# Patient Record
Sex: FEMALE | Race: BLACK OR AFRICAN AMERICAN | NOT HISPANIC OR LATINO | Employment: UNEMPLOYED | URBAN - METROPOLITAN AREA
[De-identification: names, ages, dates, MRNs, and addresses within clinical notes are randomized per-mention and may not be internally consistent; named-entity substitution may affect disease eponyms.]

---

## 2018-05-31 ENCOUNTER — INITIAL PRENATAL (OUTPATIENT)
Dept: OBSTETRICS AND GYNECOLOGY | Facility: CLINIC | Age: 33
End: 2018-05-31
Payer: MEDICAID

## 2018-05-31 VITALS
BODY MASS INDEX: 22.56 KG/M2 | DIASTOLIC BLOOD PRESSURE: 60 MMHG | HEIGHT: 69 IN | WEIGHT: 152.31 LBS | SYSTOLIC BLOOD PRESSURE: 102 MMHG

## 2018-05-31 DIAGNOSIS — O99.332 MATERNAL TOBACCO USE IN SECOND TRIMESTER: ICD-10-CM

## 2018-05-31 DIAGNOSIS — Z34.02 ENCOUNTER FOR SUPERVISION OF NORMAL FIRST PREGNANCY IN SECOND TRIMESTER: ICD-10-CM

## 2018-05-31 PROBLEM — Z34.92 ENCOUNTER FOR SUPERVISION OF NORMAL PREGNANCY IN SECOND TRIMESTER: Status: RESOLVED | Noted: 2018-05-31 | Resolved: 2018-05-31

## 2018-05-31 PROBLEM — Z34.92 ENCOUNTER FOR SUPERVISION OF NORMAL PREGNANCY IN SECOND TRIMESTER: Status: ACTIVE | Noted: 2018-05-31

## 2018-05-31 PROBLEM — O99.330 TOBACCO USE IN PREGNANCY: Status: ACTIVE | Noted: 2018-05-31

## 2018-05-31 PROBLEM — Z34.90 SUPERVISION OF NORMAL PREGNANCY: Status: ACTIVE | Noted: 2018-05-31

## 2018-05-31 PROCEDURE — 99202 OFFICE O/P NEW SF 15 MIN: CPT | Mod: TH,S$PBB,, | Performed by: STUDENT IN AN ORGANIZED HEALTH CARE EDUCATION/TRAINING PROGRAM

## 2018-05-31 PROCEDURE — 99999 PR PBB SHADOW E&M-NEW PATIENT-LVL II: CPT | Mod: PBBFAC,,, | Performed by: STUDENT IN AN ORGANIZED HEALTH CARE EDUCATION/TRAINING PROGRAM

## 2018-05-31 PROCEDURE — 99202 OFFICE O/P NEW SF 15 MIN: CPT | Mod: PBBFAC,TH,PO | Performed by: STUDENT IN AN ORGANIZED HEALTH CARE EDUCATION/TRAINING PROGRAM

## 2018-05-31 NOTE — PROGRESS NOTES
"Complaints today:  None. No vb/lof/ctxns. Normal fm.  Just moved here from [place].    /60   Ht 5' 9" (1.753 m)   Wt 69.1 kg (152 lb 5.4 oz)   LMP 2017   BMI 22.50 kg/m²     32 y.o., at 22w3d by Estimated Date of Delivery: 10/1/18  Patient Active Problem List   Diagnosis    Supervision of normal pregnancy     OB History    Para Term  AB Living   1             SAB TAB Ectopic Multiple Live Births                  # Outcome Date GA Lbr Tom/2nd Weight Sex Delivery Anes PTL Lv   1 Current                   Dating reviewed  Allergies and problem list reviewed and updated  Medical and surgical history reviewed  Prenatal labs reviewed and updated    Physical Exam:  ABD: soft, gravid, nontender    Assessment/Plan:  --records requested  --MFM scan ordered  --f/u in 2 weeks: will need glucola at next visit      "

## 2018-06-03 ENCOUNTER — PATIENT MESSAGE (OUTPATIENT)
Dept: OBSTETRICS AND GYNECOLOGY | Facility: CLINIC | Age: 33
End: 2018-06-03

## 2018-06-12 ENCOUNTER — OFFICE VISIT (OUTPATIENT)
Dept: MATERNAL FETAL MEDICINE | Facility: CLINIC | Age: 33
End: 2018-06-12
Payer: MEDICAID

## 2018-06-12 DIAGNOSIS — Z36.89 ENCOUNTER FOR ULTRASOUND TO CHECK FETAL GROWTH: Primary | ICD-10-CM

## 2018-06-12 DIAGNOSIS — Z36.89 ENCOUNTER FOR FETAL ANATOMIC SURVEY: ICD-10-CM

## 2018-06-12 DIAGNOSIS — Z34.02 ENCOUNTER FOR SUPERVISION OF NORMAL FIRST PREGNANCY IN SECOND TRIMESTER: ICD-10-CM

## 2018-06-12 DIAGNOSIS — Z36.89 ESTABLISH GESTATIONAL AGE, ULTRASOUND: ICD-10-CM

## 2018-06-12 PROCEDURE — 76805 OB US >/= 14 WKS SNGL FETUS: CPT | Mod: 26,S$PBB,, | Performed by: OBSTETRICS & GYNECOLOGY

## 2018-06-12 PROCEDURE — 99499 UNLISTED E&M SERVICE: CPT | Mod: S$PBB,,, | Performed by: OBSTETRICS & GYNECOLOGY

## 2018-06-12 PROCEDURE — 76805 OB US >/= 14 WKS SNGL FETUS: CPT | Mod: PBBFAC | Performed by: OBSTETRICS & GYNECOLOGY

## 2018-06-12 NOTE — PROGRESS NOTES
OB Ultrasound Report (see PDF version under imaging tab)    Indication  ========    Fetal anatomy survey.    Method  ======    [place] E10, Transabdominal ultrasound examination. View: Suboptimal view: limited by fetal position.    Pregnancy  =========    Weiss pregnancy. Number of fetuses: 1.    Dating  ======    Cycle: LMP date uncertain  Ultrasound examination on: 6/12/2018  GA by U/S based upon: AC, BPD, Femur, HC  GA by U/S 25 w + 4 d  KARLA by U/S: 9/21/2018  Assigned: Dating performed on 06/12/2018, based on ultrasound (AC, BPD, Femur, HC)  Assigned GA 25 w + 4 d  Assigned KARLA: 9/21/2018    General Evaluation  ===============    Cardiac activity: present.  bpm.  Fetal movements: visualized.  Placenta: posterior.  Umbilical cord: placental insertion: normal, normal, 3 vessel cord.  Amniotic fluid: normal amount.    Fetal Biometry  ===========    Fetal Biometry  BPD 65.8 mm 26w 4d Hadlock  OFD 80.4 mm 26w 1d Alek  .9 mm 25w 2d Hadlock  .3 mm 25w 1d Hadlock  Femur 46.3 mm 25w 3d Hadlock  Humerus 45.4 mm 26w 6d Alek   g 39% Keenan  Calculated by: Hadlock (BPD-HC-AC-FL)  EFW (lb) 1 lb  EFW (oz) 12 oz  Cephalic index 0.82  HC / AC 1.13  FL / BPD 0.70  FL / AC 0.23  MVP 5.1 cm   bpm  Head / Face / Neck   3.6 mm    Fetal Anatomy  ===========    Cranium: normal  Lateral ventricles: normal  Choroid plexus: normal  Midline falx: normal  Cavum septi pellucidi: suboptimal  Cerebellum: normal  Cisterna magna: normal  Rt lateral ventricle: normal  Lt lateral ventricle: normal  Rt choroid plexus: normal  Lt choroid plexus: normal  Lips: normal  Profile: normal  Nose: normal  4-chamber view: normal  RVOT: normal  LVOT: normal  Situs: normal  Aortic arch: normal  3-vessel-trachea view: normal  Cardiac position: normal  Cardiac axis: normal  Cardiac size: normal  Cardiac rhythm: normal  Rt lung: normal  Lt lung: normal  Diaphragm: normal  Cord  insertion: normal  Stomach: normal  Kidneys: normal  Bladder: normal  Genitals: normal  Abdom. wall: appears normal  Abdom. cavity: normal  Rt kidney: normal  Lt kidney: normal  Cervical spine: normal  Thoracic spine: normal  Lumbar spine: normal  Sacral spine: normal  Arms: normal  Legs: normal  Rt arm: normal  Lt arm: normal  Rt hand: present  Lt hand: present  Rt leg: normal  Lt leg: normal  Rt foot: normal  Lt foot: normal  Gender: male  Wants to know gender: yes    Maternal Structures  ===============    Uterus / Cervix  Uterus: Normal  Ovaries / Tubes / Adnexa  Rt ovary: Visualized  Lt ovary: Visualized    Impression  =========    1. IUP - 25 and 4/7 weeks +/- 2 weeks, based on today's ultrasound exam  2. No fetal structural malformations are identified, but the exam is incomplete as noted above.  3. No evidence of placenta previa.    Recommendation  ==============    Because the patient's pregnancy is suboptimally dated (ultrasound assignment of KARLA after 22 weeks), a f/u exam in 4 - 6 weeks to  assess fetal growth is recommended and has been scheduled. Also recommend initiation of fetal antepartum testing at 39 - 40 weeks  since actual gestational age may be up to 2 weeks more advanced than assigned KARLA.

## 2018-06-19 ENCOUNTER — TELEPHONE (OUTPATIENT)
Dept: OBSTETRICS AND GYNECOLOGY | Facility: CLINIC | Age: 33
End: 2018-06-19

## 2018-06-19 NOTE — TELEPHONE ENCOUNTER
----- Message from Lelia Payan sent at 6/19/2018 10:50 AM CDT -----  Contact: Self  Patient needs pregnancy confirmation faxed to the [facility]. [facility] office fax number is [phone].

## 2018-06-28 ENCOUNTER — TELEPHONE (OUTPATIENT)
Dept: OBSTETRICS AND GYNECOLOGY | Facility: CLINIC | Age: 33
End: 2018-06-28

## 2018-06-28 NOTE — TELEPHONE ENCOUNTER
Spoke to pt labor precautions given per Dr. Conn. Pt scheduled for a f/u on 7/3 with Anny Gomez. Pt advised that she will be seeing a np and expressed understanding

## 2018-06-28 NOTE — TELEPHONE ENCOUNTER
----- Message from Lelia Payan sent at 6/28/2018 11:20 AM CDT -----  Contact: Self   Patient stated she just noticed a big discharge and would like to be contacted before going to the ED for treatment. The patient can be reached at [phone].

## 2018-07-03 ENCOUNTER — APPOINTMENT (OUTPATIENT)
Dept: LAB | Facility: HOSPITAL | Age: 33
End: 2018-07-03
Attending: ADVANCED PRACTICE MIDWIFE
Payer: MEDICAID

## 2018-07-03 ENCOUNTER — PATIENT MESSAGE (OUTPATIENT)
Dept: OBSTETRICS AND GYNECOLOGY | Facility: CLINIC | Age: 33
End: 2018-07-03

## 2018-07-03 ENCOUNTER — ROUTINE PRENATAL (OUTPATIENT)
Dept: OBSTETRICS AND GYNECOLOGY | Facility: CLINIC | Age: 33
End: 2018-07-03
Payer: MEDICAID

## 2018-07-03 VITALS — SYSTOLIC BLOOD PRESSURE: 112 MMHG | BODY MASS INDEX: 23.11 KG/M2 | DIASTOLIC BLOOD PRESSURE: 62 MMHG | WEIGHT: 156.5 LBS

## 2018-07-03 DIAGNOSIS — Z3A.28 28 WEEKS GESTATION OF PREGNANCY: Primary | ICD-10-CM

## 2018-07-03 DIAGNOSIS — Z34.02 ENCOUNTER FOR SUPERVISION OF NORMAL FIRST PREGNANCY IN SECOND TRIMESTER: ICD-10-CM

## 2018-07-03 LAB
BASOPHILS # BLD AUTO: 0.01 K/UL
BASOPHILS NFR BLD: 0.1 %
DIFFERENTIAL METHOD: ABNORMAL
EOSINOPHIL # BLD AUTO: 0.1 K/UL
EOSINOPHIL NFR BLD: 0.7 %
ERYTHROCYTE [DISTWIDTH] IN BLOOD BY AUTOMATED COUNT: 13.6 %
GLUCOSE SERPL-MCNC: 132 MG/DL
HCT VFR BLD AUTO: 34.8 %
HGB BLD-MCNC: 11.3 G/DL
IMM GRANULOCYTES # BLD AUTO: 0.06 K/UL
IMM GRANULOCYTES NFR BLD AUTO: 0.6 %
LYMPHOCYTES # BLD AUTO: 2.8 K/UL
LYMPHOCYTES NFR BLD: 28.9 %
MCH RBC QN AUTO: 30.4 PG
MCHC RBC AUTO-ENTMCNC: 32.5 G/DL
MCV RBC AUTO: 94 FL
MONOCYTES # BLD AUTO: 0.5 K/UL
MONOCYTES NFR BLD: 4.8 %
NEUTROPHILS # BLD AUTO: 6.3 K/UL
NEUTROPHILS NFR BLD: 64.9 %
NRBC BLD-RTO: 0 /100 WBC
PLATELET # BLD AUTO: 212 K/UL
PMV BLD AUTO: 11.3 FL
RBC # BLD AUTO: 3.72 M/UL
WBC # BLD AUTO: 9.7 K/UL

## 2018-07-03 PROCEDURE — 99999 PR PBB SHADOW E&M-EST. PATIENT-LVL III: CPT | Mod: PBBFAC,,, | Performed by: ADVANCED PRACTICE MIDWIFE

## 2018-07-03 PROCEDURE — 99213 OFFICE O/P EST LOW 20 MIN: CPT | Mod: TH,S$PBB,, | Performed by: ADVANCED PRACTICE MIDWIFE

## 2018-07-03 PROCEDURE — 99213 OFFICE O/P EST LOW 20 MIN: CPT | Mod: PBBFAC,TH,PO | Performed by: ADVANCED PRACTICE MIDWIFE

## 2018-07-03 PROCEDURE — 82950 GLUCOSE TEST: CPT

## 2018-07-03 PROCEDURE — 85025 COMPLETE CBC W/AUTO DIFF WBC: CPT

## 2018-07-03 RX ORDER — PNV,CALCIUM 72/IRON/FOLIC ACID 27 MG-1 MG
TABLET ORAL
Refills: 0 | COMMUNITY
Start: 2018-06-12

## 2018-07-03 NOTE — PROGRESS NOTES
Chief Complaint   Patient presents with    Routine Prenatal Visit       33 y.o., at 27w1d by Estimated Date of Delivery: 10/1/18    Complaints today: none    ROS  OBSTETRICS:   Contractions denies   Bleeding denies   Loss of fluid denies   Fetal mvmnt Positive. Reinforced BID movement checks  GASTRO:   Nausea none   Vomiting none      OB History    Para Term  AB Living   1             SAB TAB Ectopic Multiple Live Births                  # Outcome Date GA Lbr Tom/2nd Weight Sex Delivery Anes PTL Lv   1 Current                   Dating reviewed  Allergies and problem list reviewed and updated  Medical and surgical history reviewed  Prenatal labs reviewed and updated    PHYSICAL EXAM  /62   Wt 71 kg (156 lb 8.4 oz)   LMP 2017   BMI 23.11 kg/m²     GENERAL: No acute distress  NEURO: Alert and oriented x3  PSYCH: Normal mood and affect  PULMONARY: Non-labored respiration  ABDomen: Soft, gravid, nontender    ASSESSMENT AND PLAN    gravida1 Problems (from 18 to present)     Problem Noted Resolved    Supervision of normal pregnancy 2018 by Jaya Cope MD No    Tobacco use in pregnancy 2018 by Jaya Cope MD No        Patient watched keep baby close video. Patient inquired about prenatal genetic testing. She recently moved from [place] and states previous provider did not perform genetic testing. Informed patient that she is past [place] where these tests can be performed. Anatomy US to be performed on 7/10/18.     labor precautions given  Follow-up: 3 weeks

## 2018-07-10 ENCOUNTER — OFFICE VISIT (OUTPATIENT)
Dept: MATERNAL FETAL MEDICINE | Facility: CLINIC | Age: 33
End: 2018-07-10
Payer: MEDICAID

## 2018-07-10 DIAGNOSIS — Z36.89 ENCOUNTER FOR ULTRASOUND TO CHECK FETAL GROWTH: ICD-10-CM

## 2018-07-10 PROCEDURE — 76816 OB US FOLLOW-UP PER FETUS: CPT | Mod: 26,S$PBB,, | Performed by: OBSTETRICS & GYNECOLOGY

## 2018-07-10 PROCEDURE — 99499 UNLISTED E&M SERVICE: CPT | Mod: S$PBB,,, | Performed by: OBSTETRICS & GYNECOLOGY

## 2018-07-10 PROCEDURE — 76816 OB US FOLLOW-UP PER FETUS: CPT | Mod: PBBFAC | Performed by: OBSTETRICS & GYNECOLOGY

## 2018-07-10 NOTE — PROGRESS NOTES
Obstetrical ultrasound completed today.  See report in imaging section of [facility].

## 2018-07-19 ENCOUNTER — TELEPHONE (OUTPATIENT)
Dept: OBSTETRICS AND GYNECOLOGY | Facility: CLINIC | Age: 33
End: 2018-07-19

## 2018-07-19 ENCOUNTER — NURSE TRIAGE (OUTPATIENT)
Dept: ADMINISTRATIVE | Facility: CLINIC | Age: 33
End: 2018-07-19

## 2018-07-19 ENCOUNTER — PATIENT MESSAGE (OUTPATIENT)
Dept: OBSTETRICS AND GYNECOLOGY | Facility: CLINIC | Age: 33
End: 2018-07-19

## 2018-07-19 ENCOUNTER — HOSPITAL ENCOUNTER (EMERGENCY)
Facility: OTHER | Age: 33
Discharge: HOME | End: 2018-07-19
Attending: OBSTETRICS & GYNECOLOGY
Payer: MEDICAID

## 2018-07-19 VITALS
DIASTOLIC BLOOD PRESSURE: 58 MMHG | SYSTOLIC BLOOD PRESSURE: 105 MMHG | TEMPERATURE: 97 F | OXYGEN SATURATION: 100 % | HEART RATE: 106 BPM

## 2018-07-19 DIAGNOSIS — Z3A.30 30 WEEKS GESTATION OF PREGNANCY: ICD-10-CM
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
## 2018-07-19 LAB
ABO + RH BLD: NORMAL
AMPHET+METHAMPHET UR QL: NEGATIVE
BARBITURATES UR QL SCN>200 NG/ML: NEGATIVE
BENZODIAZ UR QL SCN>200 NG/ML: NEGATIVE
BLD GP AB SCN CELLS X3 SERPL QL: NORMAL
BZE UR QL SCN: NEGATIVE
CANNABINOIDS UR QL SCN: NEGATIVE
CREAT UR-MCNC: 8.7 MG/DL
ETHANOL UR-MCNC: <10 MG/DL
METHADONE UR QL SCN>300 NG/ML: NEGATIVE
OPIATES UR QL SCN: NEGATIVE
PCP UR QL SCN>25 NG/ML: NEGATIVE
TOXICOLOGY INFORMATION: ABNORMAL

## 2018-07-19 PROCEDURE — 80307 DRUG TEST PRSMV CHEM ANLYZR: CPT

## 2018-07-19 PROCEDURE — 99284 EMERGENCY DEPT VISIT MOD MDM: CPT | Mod: 25

## 2018-07-19 PROCEDURE — 59025 FETAL NON-STRESS TEST: CPT | Mod: 26,,, | Performed by: OBSTETRICS & GYNECOLOGY

## 2018-07-19 PROCEDURE — 99284 EMERGENCY DEPT VISIT MOD MDM: CPT | Mod: 25,,, | Performed by: OBSTETRICS & GYNECOLOGY

## 2018-07-19 PROCEDURE — 86901 BLOOD TYPING SEROLOGIC RH(D): CPT

## 2018-07-19 PROCEDURE — 59025 FETAL NON-STRESS TEST: CPT

## 2018-07-19 NOTE — ED NOTES
Arrived in [place] with complaint of fall to right side of abdomen @ 6am this morning.  States + fetal movement.  Denies contractions, vaginal bleeding, or rupture of membranes.  Escorted to room 4 for NST.

## 2018-07-19 NOTE — ED PROVIDER NOTES
Encounter Date: 2018       History     Chief Complaint   Patient presents with    Fall     Nat Coleman is a 33 y.o. F at 30w6d presents complaining of a fall this morning. She had just awoken from sleep and was not feeling fully awake. She tripped and fell on her right side. She did not hit her stomach, did not hit her head and did not lose consciousness. Has felt the baby move since falling. Her Rh status is unknown at this time. Her placenta is left lateral in location.    This IUP is complicated by homelessness.  Patient denies contractions, denies vaginal bleeding, denies LOF.   Fetal Movement: normal.          Review of patient's allergies indicates:   Allergen Reactions    Pcn [penicillins] Hives     Past Medical History:   Diagnosis Date    Abnormal Pap smear of cervix     Anxiety     Depression     Thyroid disease     hypothyroidism     No past surgical history on file.  Family History   Problem Relation Age of Onset    Diabetes Father     Hypertension Father     Diabetes Mother     Hypertension Mother     Stroke Mother     Cancer Neg Hx     Ovarian cancer Neg Hx      Social History   Substance Use Topics    Smoking status: Current Every Day Smoker     Packs/day: 0.50     Years: 15.00     Types: Cigarettes    Smokeless tobacco: Never Used    Alcohol use No     Review of Systems   Constitutional: Negative for chills, diaphoresis, fatigue and fever.   HENT: Negative for sore throat.    Eyes: Negative for photophobia and visual disturbance.   Respiratory: Negative for cough, chest tightness, shortness of breath and wheezing.    Cardiovascular: Negative for chest pain and palpitations.   Gastrointestinal: Negative for abdominal pain, diarrhea, nausea and vomiting.   Genitourinary: Negative for dysuria, pelvic pain, vaginal bleeding, vaginal discharge and vaginal pain.   Musculoskeletal: Negative for back pain.   Skin: Negative for rash.   Neurological: Negative for dizziness, syncope,  weakness, light-headedness and headaches.   Hematological: Does not bruise/bleed easily.       Physical Exam     Initial Vitals [07/19/18 0700]   BP Pulse Resp Temp SpO2   (!) 105/58 106 -- 97.2 °F (36.2 °C) 100 %      MAP       --         Physical Exam    Vitals reviewed.  Constitutional: She appears well-developed and well-nourished. She is not diaphoretic. No distress.   HENT:   Head: Normocephalic and atraumatic.   Nose: Nose normal.   Eyes: Conjunctivae are normal. Right eye exhibits no discharge. Left eye exhibits no discharge. No scleral icterus.   Neck: Normal range of motion.   Cardiovascular: Normal rate, regular rhythm, normal heart sounds and intact distal pulses.   Pulmonary/Chest: No respiratory distress.   Abdominal: Soft. Bowel sounds are normal. She exhibits no distension. There is no tenderness.   Genitourinary:   Genitourinary Comments: deferred   Neurological: She is alert and oriented to person, place, and time.   Skin: Skin is warm and dry. No rash noted. No erythema.   Psychiatric: She has a normal mood and affect. Her behavior is normal. Judgment and thought content normal.         ED Course   Obtain Fetal nonstress test (NST)  Date/Time: 7/19/2018 9:14 AM  Performed by: PETE CAIN  Authorized by: JOJO LINDSAY     Nonstress Test:     Variability:  6-25 BPM    Decelerations:  None    Accelerations:  10 bpm    Baseline:  130    Uterine Irritability: No      Contractions:  Not present    Contraction Frequency:  Not noted on tocometer  Biophysical Profile:     Nonstress Test Interpretation: reactive      Overall Impression:  Reassuring      Labs Reviewed   TOXICOLOGY SCREEN, URINE, RANDOM (COMPLIANCE) - Abnormal; Notable for the following:        Result Value    Creatinine, Random Ur 8.7 (*)     All other components within normal limits   TYPE & SCREEN          Imaging Results    None          Medical Decision Making:   ED Management:  Patient did not lose consciousness, did  not have abdominal trauma. NST reactive and reassuring. Patient observed for one hour. Rh+ and feeling well. Plan to discharge home.               Attending Attestation:   Physician Attestation Statement for Resident:  As the supervising MD   Physician Attestation Statement: I have personally seen and examined this patient.   I agree with the above history. -:   As the supervising MD I agree with the above PE.    As the supervising MD I agree with the above treatment, course, plan, and disposition.  I was personally present during the critical portions of the procedure(s) performed by the resident and was immediately available in the ED to provide services and assistance as needed during the entire procedure.  I have reviewed and agree with the residents interpretation of the following: rhythm strips and lab data.  I have reviewed the following: old records at this facility.                    ED Course as of Jul 29 2210   Thu Jul 19, 2018   0739 I evaluated Ms. Coleman and discussed case with resident team. Pt presents at 30w6d with fall onto her right side this AM while getting up out of bed.  No pain or ob complaints since then.  Baby is moving well, no vaginal bleeding.  Pt here for 30 minutes, and fetal status reactive/reassuring for ga, baseline at 140.  Possible one contraction at 0724.  No type and screen in system.  Will T+S and utox due to pt acting abnormally upon arrival.  Will complete an hour of monitoring     [HU]   0914 She is RH+, utox negative, feeling well, stable for d/c home  [HU]      ED Course User Index  [HU] Mayra Delvalle DO     Clinical Impression:   The primary encounter diagnosis was Fall, initial encounter. A diagnosis of 30 weeks gestation of pregnancy was also pertinent to this visit.      Disposition:   Disposition: Discharged  Condition: Stable            Juana Colón MD  Resident  07/19/18 0920       Mayra Delvalle DO  07/29/18 2210

## 2018-07-19 NOTE — TELEPHONE ENCOUNTER
----- Message from Lelia Payan sent at 7/19/2018 10:15 AM CDT -----  Contact: Patient   Patient called regarding a pregnancy confirmation letter including her current trimester and due date to be sent to her email address as soon as today. The patient's e-mail address is [email or web address] and can be reached at [phone] for further questions and concerns.
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
## 2018-07-19 NOTE — TELEPHONE ENCOUNTER
----- Message from Lelia Payan sent at 7/19/2018 10:36 AM CDT -----  Contact: Patient   Patient called back stating her address on her confirmation letter is incorrect and if a new one can be sent to the same e-mail address reflecting the new one. The patient's address has been updated in the system.

## 2018-08-07 ENCOUNTER — ROUTINE PRENATAL (OUTPATIENT)
Dept: OBSTETRICS AND GYNECOLOGY | Facility: CLINIC | Age: 33
End: 2018-08-07
Payer: MEDICAID

## 2018-08-07 VITALS
SYSTOLIC BLOOD PRESSURE: 120 MMHG | DIASTOLIC BLOOD PRESSURE: 60 MMHG | WEIGHT: 163.38 LBS | BODY MASS INDEX: 24.12 KG/M2

## 2018-08-07 DIAGNOSIS — Z34.03 ENCOUNTER FOR SUPERVISION OF NORMAL FIRST PREGNANCY IN THIRD TRIMESTER: Primary | ICD-10-CM

## 2018-08-07 PROCEDURE — 99213 OFFICE O/P EST LOW 20 MIN: CPT | Mod: TH,S$PBB,, | Performed by: ADVANCED PRACTICE MIDWIFE

## 2018-08-07 PROCEDURE — 99999 PR PBB SHADOW E&M-EST. PATIENT-LVL II: CPT | Mod: PBBFAC,,, | Performed by: ADVANCED PRACTICE MIDWIFE

## 2018-08-07 PROCEDURE — 99212 OFFICE O/P EST SF 10 MIN: CPT | Mod: PBBFAC,TH,PO | Performed by: ADVANCED PRACTICE MIDWIFE

## 2018-08-07 NOTE — PROGRESS NOTES
Chief Complaint   Patient presents with    Routine Prenatal Visit       33 y.o., at 33w4d by Estimated Date of Delivery: 18    Complaints today: None    ROS  OBSTETRICS:   Contractions None   Bleeding           None   Loss of fluid     Denies   Fetal mvmnt Reports good Fm, reinforced BID  GASTRO:   Nausea None   Vomiting None      OB History    Para Term  AB Living   1             SAB TAB Ectopic Multiple Live Births                  # Outcome Date GA Lbr Tom/2nd Weight Sex Delivery Anes PTL Lv   1 Current                   Dating reviewed  Allergies and problem list reviewed and updated  Medical and surgical history reviewed  Prenatal labs reviewed and updated    PHYSICAL EXAM  /60   Wt 74.1 kg (163 lb 5.8 oz)   LMP 2017   BMI 24.12 kg/m²     GENERAL: No acute distress  NEURO: Alert and oriented x3  PSYCH: Normal mood and affect  PULMONARY: Non-labored respiration  ABDomen: Soft, gravid, nontender    ASSESSMENT AND PLAN    gravida1 Problems (from 18 to present)     Problem Noted Resolved    Supervision of normal pregnancy 2018 by Jaya Cope MD No    Tobacco use in pregnancy 2018 by Jaya Cope MD No          Quiet Time video viewed  Discussed TDAP- pt undecided     labor precautions given  Follow-up: 2 weeks

## 2018-08-21 ENCOUNTER — ROUTINE PRENATAL (OUTPATIENT)
Dept: OBSTETRICS AND GYNECOLOGY | Facility: CLINIC | Age: 33
End: 2018-08-21
Payer: MEDICAID

## 2018-08-21 VITALS — BODY MASS INDEX: 25.2 KG/M2 | SYSTOLIC BLOOD PRESSURE: 112 MMHG | WEIGHT: 170.63 LBS | DIASTOLIC BLOOD PRESSURE: 74 MMHG

## 2018-08-21 DIAGNOSIS — Z34.03 ENCOUNTER FOR SUPERVISION OF NORMAL FIRST PREGNANCY IN THIRD TRIMESTER: Primary | ICD-10-CM

## 2018-08-21 PROCEDURE — 99212 OFFICE O/P EST SF 10 MIN: CPT | Mod: PBBFAC,TH,PO | Performed by: ADVANCED PRACTICE MIDWIFE

## 2018-08-21 PROCEDURE — 99999 PR PBB SHADOW E&M-EST. PATIENT-LVL II: CPT | Mod: PBBFAC,,, | Performed by: ADVANCED PRACTICE MIDWIFE

## 2018-08-21 PROCEDURE — 99213 OFFICE O/P EST LOW 20 MIN: CPT | Mod: TH,S$PBB,, | Performed by: ADVANCED PRACTICE MIDWIFE

## 2018-08-21 NOTE — PROGRESS NOTES
Chief Complaint   Patient presents with    Routine Prenatal Visit       33 y.o., at 35w4d by Estimated Date of Delivery: 18    Complaints today: No c/o    ROS  OBSTETRICS:   Contractions denies   Bleeding denies   Loss of fluid denies   Fetal mvmnt Reports good Fm, reinforced bid  GASTRO:   Nausea denies   Vomiting denies      OB History    Para Term  AB Living   1             SAB TAB Ectopic Multiple Live Births                  # Outcome Date GA Lbr Tom/2nd Weight Sex Delivery Anes PTL Lv   1 Current                   Dating reviewed  Allergies and problem list reviewed and updated  Medical and surgical history reviewed  Prenatal labs reviewed and updated    PHYSICAL EXAM  /74   Wt 77.4 kg (170 lb 10.2 oz)   LMP 2017   BMI 25.20 kg/m²     GENERAL: No acute distress  NEURO: Alert and oriented x3  PSYCH: Normal mood and affect  PULMONARY: Non-labored respiration  ABDomen: Soft, gravid, nontender    ASSESSMENT AND PLAN    gravida1 Problems (from 18 to present)     Problem Noted Resolved    Supervision of normal pregnancy 2018 by Jaya Cope MD No    Tobacco use in pregnancy 2018 by Jaya Cope MD No              Reviewed s/s active labor, rom, bleeding and if occur report to L&D  Follow-up: 1 weeks

## 2018-08-28 ENCOUNTER — ROUTINE PRENATAL (OUTPATIENT)
Dept: OBSTETRICS AND GYNECOLOGY | Facility: CLINIC | Age: 33
End: 2018-08-28
Payer: MEDICAID

## 2018-08-28 ENCOUNTER — HOSPITAL ENCOUNTER (INPATIENT)
Facility: OTHER | Age: 33
LOS: 3 days | Discharge: HOME | End: 2018-08-31
Attending: OBSTETRICS & GYNECOLOGY | Admitting: OBSTETRICS & GYNECOLOGY
Payer: MEDICAID

## 2018-08-28 VITALS
WEIGHT: 171.75 LBS | BODY MASS INDEX: 25.36 KG/M2 | SYSTOLIC BLOOD PRESSURE: 114 MMHG | DIASTOLIC BLOOD PRESSURE: 66 MMHG

## 2018-08-28 DIAGNOSIS — Z3A.36 36 WEEKS GESTATION OF PREGNANCY: Primary | ICD-10-CM

## 2018-08-28 DIAGNOSIS — Z34.03 ENCOUNTER FOR SUPERVISION OF NORMAL FIRST PREGNANCY IN THIRD TRIMESTER: ICD-10-CM

## 2018-08-28 DIAGNOSIS — O42.90 AMNIOTIC FLUID LEAKING: ICD-10-CM

## 2018-08-28 LAB
BILIRUB SERPL-MCNC: NORMAL MG/DL
BLOOD URINE, POC: NORMAL
COLOR, POC UA: NORMAL
GLUCOSE UR QL STRIP: NORMAL
KETONES UR QL STRIP: NORMAL
LEUKOCYTE ESTERASE URINE, POC: NORMAL
NITRITE, POC UA: NORMAL
PH, POC UA: NORMAL
PROTEIN, POC: NORMAL
SPECIFIC GRAVITY, POC UA: NORMAL
UROBILINOGEN, POC UA: NORMAL

## 2018-08-28 PROCEDURE — 87491 CHLMYD TRACH DNA AMP PROBE: CPT

## 2018-08-28 PROCEDURE — 59025 FETAL NON-STRESS TEST: CPT

## 2018-08-28 PROCEDURE — 99285 EMERGENCY DEPT VISIT HI MDM: CPT | Mod: 25,27

## 2018-08-28 PROCEDURE — 87184 SC STD DISK METHOD PER PLATE: CPT

## 2018-08-28 PROCEDURE — 87147 CULTURE TYPE IMMUNOLOGIC: CPT

## 2018-08-28 PROCEDURE — 99283 EMERGENCY DEPT VISIT LOW MDM: CPT | Mod: 25,,, | Performed by: OBSTETRICS & GYNECOLOGY

## 2018-08-28 PROCEDURE — 87081 CULTURE SCREEN ONLY: CPT

## 2018-08-28 PROCEDURE — 11000001 HC ACUTE MED/SURG PRIVATE ROOM

## 2018-08-28 PROCEDURE — 59025 FETAL NON-STRESS TEST: CPT | Mod: 26,,, | Performed by: OBSTETRICS & GYNECOLOGY

## 2018-08-28 PROCEDURE — 99999 PR PBB SHADOW E&M-EST. PATIENT-LVL III: CPT | Mod: PBBFAC,,, | Performed by: ADVANCED PRACTICE MIDWIFE

## 2018-08-28 PROCEDURE — 99213 OFFICE O/P EST LOW 20 MIN: CPT | Mod: PBBFAC,TH,PO | Performed by: ADVANCED PRACTICE MIDWIFE

## 2018-08-28 PROCEDURE — 99213 OFFICE O/P EST LOW 20 MIN: CPT | Mod: TH,S$PBB,25, | Performed by: ADVANCED PRACTICE MIDWIFE

## 2018-08-28 NOTE — PROGRESS NOTES
Chief Complaint   Patient presents with    Routine Prenatal Visit       33 y.o., at 36w4d by Estimated Date of Delivery: 18    Complaints today: none    ROS  OBSTETRICS:   Contractions denoes   Bleeding denies   Loss of fluid denies   Fetal mvmnt Reports good FM, reinforced BID  GASTRO:   Nausea denies   Vomiting denies      OB History    Para Term  AB Living   1             SAB TAB Ectopic Multiple Live Births                  # Outcome Date GA Lbr Tom/2nd Weight Sex Delivery Anes PTL Lv   1 Current                   Dating reviewed  Allergies and problem list reviewed and updated  Medical and surgical history reviewed  Prenatal labs reviewed and updated    PHYSICAL EXAM  /66   Wt 77.9 kg (171 lb 11.8 oz)   LMP 2017   BMI 25.36 kg/m²     GENERAL: No acute distress  NEURO: Alert and oriented x3  PSYCH: Normal mood and affect  PULMONARY: Non-labored respiration  ABDomen: Soft, gravid, nontender    ASSESSMENT AND PLAN    gravida1 Problems (from 18 to present)     Problem Noted Resolved    Supervision of normal pregnancy 2018 by Jaya Cope MD No    Tobacco use in pregnancy 2018 by Jaya Cope MD No               labor precautions given  Follow-up: 1 weeks

## 2018-08-29 ENCOUNTER — ANESTHESIA (OUTPATIENT)
Dept: OBSTETRICS AND GYNECOLOGY | Facility: OTHER | Age: 33
End: 2018-08-29
Payer: MEDICAID

## 2018-08-29 ENCOUNTER — ANESTHESIA EVENT (OUTPATIENT)
Dept: OBSTETRICS AND GYNECOLOGY | Facility: OTHER | Age: 33
End: 2018-08-29
Payer: MEDICAID

## 2018-08-29 PROBLEM — Z3A.36 36 WEEKS GESTATION OF PREGNANCY: Status: ACTIVE | Noted: 2018-08-29

## 2018-08-29 PROBLEM — O42.919 PRETERM PREMATURE RUPTURE OF MEMBRANES (PPROM) WITH UNKNOWN ONSET OF LABOR: Status: ACTIVE | Noted: 2018-08-29

## 2018-08-29 LAB
ABO + RH BLD: NORMAL
BASOPHILS # BLD AUTO: 0 K/UL
BASOPHILS # BLD AUTO: 0.01 K/UL
BASOPHILS NFR BLD: 0 %
BASOPHILS NFR BLD: 0.1 %
BLD GP AB SCN CELLS X3 SERPL QL: NORMAL
DIFFERENTIAL METHOD: ABNORMAL
DIFFERENTIAL METHOD: ABNORMAL
EOSINOPHIL # BLD AUTO: 0 K/UL
EOSINOPHIL # BLD AUTO: 0.1 K/UL
EOSINOPHIL NFR BLD: 0 %
EOSINOPHIL NFR BLD: 0.5 %
ERYTHROCYTE [DISTWIDTH] IN BLOOD BY AUTOMATED COUNT: 13.6 %
ERYTHROCYTE [DISTWIDTH] IN BLOOD BY AUTOMATED COUNT: 14 %
HCT VFR BLD AUTO: 33.4 %
HCT VFR BLD AUTO: 37 %
HGB BLD-MCNC: 11.1 G/DL
HGB BLD-MCNC: 12.4 G/DL
HIV 1+2 AB+HIV1 P24 AG SERPL QL IA: NEGATIVE
HIV 1+2 AB+HIV1 P24 AG SERPL QL IA: NEGATIVE
HIV1+2 IGG SERPL QL IA.RAPID: NEGATIVE
HIV1+2 IGG SERPL QL IA.RAPID: NEGATIVE
LYMPHOCYTES # BLD AUTO: 1.9 K/UL
LYMPHOCYTES # BLD AUTO: 4.3 K/UL
LYMPHOCYTES NFR BLD: 10.4 %
LYMPHOCYTES NFR BLD: 32.8 %
MCH RBC QN AUTO: 30 PG
MCH RBC QN AUTO: 30.9 PG
MCHC RBC AUTO-ENTMCNC: 33.2 G/DL
MCHC RBC AUTO-ENTMCNC: 33.5 G/DL
MCV RBC AUTO: 90 FL
MCV RBC AUTO: 92 FL
MONOCYTES # BLD AUTO: 0.9 K/UL
MONOCYTES # BLD AUTO: 1 K/UL
MONOCYTES NFR BLD: 5.4 %
MONOCYTES NFR BLD: 7 %
NEUTROPHILS # BLD AUTO: 15.1 K/UL
NEUTROPHILS # BLD AUTO: 7.9 K/UL
NEUTROPHILS NFR BLD: 59.4 %
NEUTROPHILS NFR BLD: 84 %
PLATELET # BLD AUTO: 221 K/UL
PLATELET # BLD AUTO: 236 K/UL
PMV BLD AUTO: 10.4 FL
PMV BLD AUTO: 10.8 FL
RBC # BLD AUTO: 3.7 M/UL
RBC # BLD AUTO: 4.01 M/UL
RPR SER QL: NORMAL
RPR SER QL: NORMAL
WBC # BLD AUTO: 13.23 K/UL
WBC # BLD AUTO: 18.01 K/UL

## 2018-08-29 PROCEDURE — 25000003 PHARM REV CODE 250: Performed by: ANESTHESIOLOGY

## 2018-08-29 PROCEDURE — 36415 COLL VENOUS BLD VENIPUNCTURE: CPT

## 2018-08-29 PROCEDURE — 59409 OBSTETRICAL CARE: CPT | Mod: AA,,, | Performed by: ANESTHESIOLOGY

## 2018-08-29 PROCEDURE — 62326 NJX INTERLAMINAR LMBR/SAC: CPT | Performed by: ANESTHESIOLOGY

## 2018-08-29 PROCEDURE — 63600175 PHARM REV CODE 636 W HCPCS: Performed by: OBSTETRICS & GYNECOLOGY

## 2018-08-29 PROCEDURE — 86703 HIV-1/HIV-2 1 RESULT ANTBDY: CPT | Mod: 91

## 2018-08-29 PROCEDURE — 3E0P7VZ INTRODUCTION OF HORMONE INTO FEMALE REPRODUCTIVE, VIA NATURAL OR ARTIFICIAL OPENING: ICD-10-PCS | Performed by: OBSTETRICS & GYNECOLOGY

## 2018-08-29 PROCEDURE — 63600175 PHARM REV CODE 636 W HCPCS: Performed by: STUDENT IN AN ORGANIZED HEALTH CARE EDUCATION/TRAINING PROGRAM

## 2018-08-29 PROCEDURE — 72200005 HC VAGINAL DELIVERY LEVEL II

## 2018-08-29 PROCEDURE — 86592 SYPHILIS TEST NON-TREP QUAL: CPT

## 2018-08-29 PROCEDURE — 59409 OBSTETRICAL CARE: CPT | Mod: AT,,, | Performed by: OBSTETRICS & GYNECOLOGY

## 2018-08-29 PROCEDURE — 0UQMXZZ REPAIR VULVA, EXTERNAL APPROACH: ICD-10-PCS | Performed by: OBSTETRICS & GYNECOLOGY

## 2018-08-29 PROCEDURE — 27800517 HC TRAY,EPIDURAL-CONTINUOUS: Performed by: ANESTHESIOLOGY

## 2018-08-29 PROCEDURE — 4A0HXCZ MEASUREMENT OF PRODUCTS OF CONCEPTION, CARDIAC RATE, EXTERNAL APPROACH: ICD-10-PCS | Performed by: OBSTETRICS & GYNECOLOGY

## 2018-08-29 PROCEDURE — 3E0R3BZ INTRODUCTION OF ANESTHETIC AGENT INTO SPINAL CANAL, PERCUTANEOUS APPROACH: ICD-10-PCS | Performed by: ANESTHESIOLOGY

## 2018-08-29 PROCEDURE — 25000003 PHARM REV CODE 250: Performed by: STUDENT IN AN ORGANIZED HEALTH CARE EDUCATION/TRAINING PROGRAM

## 2018-08-29 PROCEDURE — 86703 HIV-1/HIV-2 1 RESULT ANTBDY: CPT

## 2018-08-29 PROCEDURE — 86901 BLOOD TYPING SEROLOGIC RH(D): CPT

## 2018-08-29 PROCEDURE — 11000001 HC ACUTE MED/SURG PRIVATE ROOM

## 2018-08-29 PROCEDURE — 25000003 PHARM REV CODE 250: Performed by: OBSTETRICS & GYNECOLOGY

## 2018-08-29 PROCEDURE — 85025 COMPLETE CBC W/AUTO DIFF WBC: CPT

## 2018-08-29 PROCEDURE — 72100003 HC LABOR CARE, EA. ADDL. 8 HRS

## 2018-08-29 PROCEDURE — 27200710 HC EPIDURAL INFUSION PUMP SET: Performed by: ANESTHESIOLOGY

## 2018-08-29 PROCEDURE — 87340 HEPATITIS B SURFACE AG IA: CPT

## 2018-08-29 PROCEDURE — 51702 INSERT TEMP BLADDER CATH: CPT

## 2018-08-29 PROCEDURE — 00HU33Z INSERTION OF INFUSION DEVICE INTO SPINAL CANAL, PERCUTANEOUS APPROACH: ICD-10-PCS | Performed by: ANESTHESIOLOGY

## 2018-08-29 PROCEDURE — 72100002 HC LABOR CARE, 1ST 8 HOURS

## 2018-08-29 RX ORDER — OXYTOCIN/RINGER'S LACTATE 20/1000 ML
41.65 PLASTIC BAG, INJECTION (ML) INTRAVENOUS CONTINUOUS
Status: ACTIVE | OUTPATIENT
Start: 2018-08-29 | End: 2018-08-29

## 2018-08-29 RX ORDER — DOCUSATE SODIUM 100 MG/1
200 CAPSULE, LIQUID FILLED ORAL 2 TIMES DAILY PRN
Status: DISCONTINUED | OUTPATIENT
Start: 2018-08-29 | End: 2018-08-31 | Stop reason: HOSPADM

## 2018-08-29 RX ORDER — SODIUM CHLORIDE, SODIUM LACTATE, POTASSIUM CHLORIDE, CALCIUM CHLORIDE 600; 310; 30; 20 MG/100ML; MG/100ML; MG/100ML; MG/100ML
INJECTION, SOLUTION INTRAVENOUS CONTINUOUS
Status: DISCONTINUED | OUTPATIENT
Start: 2018-08-29 | End: 2018-08-29

## 2018-08-29 RX ORDER — IBUPROFEN 600 MG/1
600 TABLET ORAL EVERY 6 HOURS
Status: DISCONTINUED | OUTPATIENT
Start: 2018-08-29 | End: 2018-08-31 | Stop reason: HOSPADM

## 2018-08-29 RX ORDER — OXYTOCIN/RINGER'S LACTATE 20/1000 ML
2 PLASTIC BAG, INJECTION (ML) INTRAVENOUS CONTINUOUS
Status: DISCONTINUED | OUTPATIENT
Start: 2018-08-29 | End: 2018-08-31 | Stop reason: HOSPADM

## 2018-08-29 RX ORDER — ONDANSETRON 8 MG/1
8 TABLET, ORALLY DISINTEGRATING ORAL EVERY 8 HOURS PRN
Status: DISCONTINUED | OUTPATIENT
Start: 2018-08-29 | End: 2018-08-31 | Stop reason: HOSPADM

## 2018-08-29 RX ORDER — BETAMETHASONE SODIUM PHOSPHATE AND BETAMETHASONE ACETATE 3; 3 MG/ML; MG/ML
12 INJECTION, SUSPENSION INTRA-ARTICULAR; INTRALESIONAL; INTRAMUSCULAR; SOFT TISSUE ONCE
Status: COMPLETED | OUTPATIENT
Start: 2018-08-29 | End: 2018-08-29

## 2018-08-29 RX ORDER — HYDROCODONE BITARTRATE AND ACETAMINOPHEN 10; 325 MG/1; MG/1
1 TABLET ORAL EVERY 4 HOURS PRN
Status: DISCONTINUED | OUTPATIENT
Start: 2018-08-29 | End: 2018-08-31 | Stop reason: HOSPADM

## 2018-08-29 RX ORDER — SODIUM CHLORIDE 9 MG/ML
INJECTION, SOLUTION INTRAVENOUS
Status: DISCONTINUED | OUTPATIENT
Start: 2018-08-29 | End: 2018-08-29

## 2018-08-29 RX ORDER — CARBOPROST TROMETHAMINE 250 UG/ML
250 INJECTION, SOLUTION INTRAMUSCULAR
Status: DISCONTINUED | OUTPATIENT
Start: 2018-08-29 | End: 2018-08-29

## 2018-08-29 RX ORDER — DIPHENHYDRAMINE HCL 25 MG
25 CAPSULE ORAL EVERY 4 HOURS PRN
Status: DISCONTINUED | OUTPATIENT
Start: 2018-08-29 | End: 2018-08-31 | Stop reason: HOSPADM

## 2018-08-29 RX ORDER — BUPIVACAINE HYDROCHLORIDE 2.5 MG/ML
INJECTION, SOLUTION EPIDURAL; INFILTRATION; INTRACAUDAL
Status: DISPENSED
Start: 2018-08-29 | End: 2018-08-29

## 2018-08-29 RX ORDER — OXYTOCIN/RINGER'S LACTATE 20/1000 ML
41.65 PLASTIC BAG, INJECTION (ML) INTRAVENOUS CONTINUOUS
Status: DISCONTINUED | OUTPATIENT
Start: 2018-08-29 | End: 2018-08-29

## 2018-08-29 RX ORDER — ONDANSETRON 8 MG/1
8 TABLET, ORALLY DISINTEGRATING ORAL EVERY 8 HOURS PRN
Status: DISCONTINUED | OUTPATIENT
Start: 2018-08-29 | End: 2018-08-29

## 2018-08-29 RX ORDER — METHYLERGONOVINE MALEATE 0.2 MG/ML
200 INJECTION INTRAVENOUS
Status: DISCONTINUED | OUTPATIENT
Start: 2018-08-29 | End: 2018-08-29

## 2018-08-29 RX ORDER — METOCLOPRAMIDE HYDROCHLORIDE 5 MG/ML
5 INJECTION INTRAMUSCULAR; INTRAVENOUS EVERY 6 HOURS PRN
Status: DISCONTINUED | OUTPATIENT
Start: 2018-08-29 | End: 2018-08-31 | Stop reason: HOSPADM

## 2018-08-29 RX ORDER — TERBUTALINE SULFATE 1 MG/ML
0.25 INJECTION SUBCUTANEOUS
Status: DISCONTINUED | OUTPATIENT
Start: 2018-08-29 | End: 2018-08-29

## 2018-08-29 RX ORDER — HYDROCODONE BITARTRATE AND ACETAMINOPHEN 5; 325 MG/1; MG/1
1 TABLET ORAL EVERY 4 HOURS PRN
Status: DISCONTINUED | OUTPATIENT
Start: 2018-08-29 | End: 2018-08-31 | Stop reason: HOSPADM

## 2018-08-29 RX ORDER — MISOPROSTOL 200 UG/1
600 TABLET ORAL
Status: DISCONTINUED | OUTPATIENT
Start: 2018-08-29 | End: 2018-08-29

## 2018-08-29 RX ORDER — FENTANYL/BUPIVACAINE/NS/PF 2MCG/ML-.1
PLASTIC BAG, INJECTION (ML) INJECTION
Status: DISPENSED
Start: 2018-08-29 | End: 2018-08-29

## 2018-08-29 RX ORDER — HYDROCORTISONE 25 MG/G
CREAM TOPICAL 3 TIMES DAILY PRN
Status: DISCONTINUED | OUTPATIENT
Start: 2018-08-29 | End: 2018-08-31 | Stop reason: HOSPADM

## 2018-08-29 RX ORDER — AMOXICILLIN 250 MG
1 CAPSULE ORAL NIGHTLY
Status: DISCONTINUED | OUTPATIENT
Start: 2018-08-29 | End: 2018-08-31 | Stop reason: HOSPADM

## 2018-08-29 RX ORDER — VANCOMYCIN HCL IN 5 % DEXTROSE 1G/250ML
1000 PLASTIC BAG, INJECTION (ML) INTRAVENOUS
Status: DISCONTINUED | OUTPATIENT
Start: 2018-08-29 | End: 2018-08-29

## 2018-08-29 RX ADMIN — IBUPROFEN 600 MG: 600 TABLET ORAL at 02:08

## 2018-08-29 RX ADMIN — IBUPROFEN 600 MG: 600 TABLET ORAL at 08:08

## 2018-08-29 RX ADMIN — VANCOMYCIN HYDROCHLORIDE 1000 MG: 1 INJECTION, POWDER, LYOPHILIZED, FOR SOLUTION INTRAVENOUS at 01:08

## 2018-08-29 RX ADMIN — Medication 2 MILLI-UNITS/MIN: at 08:08

## 2018-08-29 RX ADMIN — BETAMETHASONE SODIUM PHOSPHATE AND BETAMETHASONE ACETATE 12 MG: 3; 3 INJECTION, SUSPENSION INTRA-ARTICULAR; INTRALESIONAL; INTRAMUSCULAR at 02:08

## 2018-08-29 RX ADMIN — STANDARDIZED SENNA CONCENTRATE AND DOCUSATE SODIUM 1 TABLET: 8.6; 5 TABLET, FILM COATED ORAL at 08:08

## 2018-08-29 RX ADMIN — MISOPROSTOL 50 MCG: 100 TABLET ORAL at 01:08

## 2018-08-29 RX ADMIN — Medication 41.65 MILLI-UNITS/MIN: at 10:08

## 2018-08-29 RX ADMIN — Medication 10 ML: at 07:08

## 2018-08-29 NOTE — H&P
[facility]  Obstetrics  History & Physical    Patient Name: Nat Coleman  MRN: [id number]  Admission Date: 2018  Primary Care Provider: Primary Doctor No    Subjective:     Principal Problem: premature rupture of membranes (PPROM) with unknown onset of labor    History of Present Illness:  Nat Coleman is a 33 y.o. F at 36w5d presents complaining of leakage of clear fluids starting at 9:30pm this evening. She has been having cramping since then but no contractions or vaginal bleeding. She notes good fetal movement.   This IUP is complicated by tobacco abuse and h/o hypothyroidism for which she is not on medication.      Obstetric History       T0      L0     SAB0   TAB0   Ectopic0   Multiple0   Live Births0       # Outcome Date GA Lbr Tom/2nd Weight Sex Delivery Anes PTL Lv   1 Current                 Past Medical History:   Diagnosis Date    Abnormal Pap smear of cervix     Anxiety     Depression     Thyroid disease     hypothyroidism     No past surgical history on file.    PTA Medications   Medication Sig    prenatal vit calc,iron,folic (PRENATAL VITAMIN ORAL) Take by mouth.    PREPLUS 27 mg iron- 1 mg Tab TK ONE T PO ONCE D       Review of patient's allergies indicates:   Allergen Reactions    Pcn [penicillins] Hives        Family History     Problem Relation (Age of Onset)    Diabetes Father, Mother    Hypertension Father, Mother    Stroke Mother        Tobacco Use    Smoking status: Current Every Day Smoker     Packs/day: 0.50     Years: 15.00     Pack years: 7.50     Types: Cigarettes    Smokeless tobacco: Never Used   Substance and Sexual Activity    Alcohol use: No    Drug use: No    Sexual activity: Yes     Review of Systems   Constitutional: Negative for chills and fever.   Respiratory: Negative for shortness of breath.    Cardiovascular: Negative for chest pain.   Gastrointestinal: Positive for abdominal pain (cramping). Negative for  constipation, diarrhea, nausea and vomiting.   Genitourinary: Positive for vaginal discharge (clear fluid). Negative for dysuria and hematuria.   Neurological: Negative for headaches.      Objective:     Vital Signs (Most Recent):  Temp: 97.8 °F (36.6 °C) (18 0721)  Pulse: 77 (18 0829)  Resp: 18 (18 0721)  BP: 122/71 (18 0826)  SpO2: 99 % (18 0829) Vital Signs (24h Range):  Temp:  [97.8 °F (36.6 °C)-98.4 °F (36.9 °C)] 97.8 °F (36.6 °C)  Pulse:  [69-95] 77  Resp:  [18] 18  SpO2:  [97 %-100 %] 99 %  BP: (101-137)/(54-81) 122/71     Weight: 78 kg (171 lb 15.3 oz)  Body mass index is 25.39 kg/m².    FHT: 140 Cat 1 (reassuring)  TOCO:  not visible    Physical Exam:   Constitutional: She is oriented to person, place, and time. She appears well-developed and well-nourished. No distress.    HENT:   Head: Normocephalic and atraumatic.    Eyes: EOM are normal.    Neck: Normal range of motion.    Cardiovascular: Normal rate, regular rhythm and normal heart sounds.     Pulmonary/Chest: Effort normal and breath sounds normal. No respiratory distress.        Abdominal: There is no tenderness.   Gravid      Genitourinary:   Genitourinary Comments: SSE: gross pooling, nitrazine positive               Neurological: She is alert and oriented to person, place, and time.    Skin: Skin is warm and dry. She is not diaphoretic.    Psychiatric: She has a normal mood and affect. Her behavior is normal. Judgment and thought content normal.       Cervix:  Dilation:  1  Effacement:  60  Station: -3  Presentation: Vertex     Significant Labs:  Lab Results   Component Value Date    GROUPTRH O POS 2018       I have personallly reviewed all pertinent lab results from the last 24 hours.    Assessment/Plan:     33 y.o. female  at 36w5d for:    *  premature rupture of membranes (PPROM) with unknown onset of labor    - Consents signed and to chart  - Admit to Labor and Delivery unit  - Plan for induction  is cytotec  - Epidural per Anesthesia  - Draw CBC, T&S  - Notify Staff  - Ultrasound performed, fetus in vertex position.   - Recheck in 4 hrs or PRN  - Post-Partum Hemorrhage risk - low  - BMZ ordered  - GBS unknown (collected today), will start vancomycin 2/2 PCN allergy            Sushma K Reddy, MD  Obstetrics  [facility]    Admitted to L&D with PROM.  I have reviewed the resident's note, and agree with the diagnosis and management plan

## 2018-08-29 NOTE — HPI
Nat Coleman is a 33 y.o. F at 36w5d presents complaining of leakage of clear fluids starting at 9:30pm this evening. She has been having cramping since then but no contractions or vaginal bleeding. She notes good fetal movement.   This IUP is complicated by tobacco abuse and h/o hypothyroidism for which she is not on medication.

## 2018-08-29 NOTE — ASSESSMENT & PLAN NOTE
- Consents signed and to chart  - Admit to Labor and Delivery unit  - Plan for induction is cytotec  - Epidural per Anesthesia  - Draw CBC, T&S  - Notify Staff  - Ultrasound performed, fetus in vertex position.   - Recheck in 4 hrs or PRN  - Post-Partum Hemorrhage risk - low  - BMZ ordered  - GBS unknown (collected today), will start vancomycin 2/2 PCN allergy

## 2018-08-29 NOTE — L&D DELIVERY NOTE
[facility]  Vaginal Delivery   Operative Note    SUMMARY     Normal spontaneous vaginal delivery of live infant, was placed on mothers abdomen for skin to skin and bulb suctioning performed.  Infant delivered position OA over intact perineum.  Nuchal cord: No.    Manual delivery of placenta and IV pitocin given noting good uterine tone.  bilateral periurethral lacerations noted, repaired with 3-0 vicryl, good hemostasis.  Patient tolerated delivery well. Sponge needle and lap counted correctly x2.    Indications:  (spontaneous vaginal delivery)  Pregnancy complicated by:   Patient Active Problem List   Diagnosis    Supervision of normal pregnancy    Tobacco use in pregnancy    36 weeks gestation of pregnancy     premature rupture of membranes (PPROM) with unknown onset of labor     (spontaneous vaginal delivery)     Admitting GA: 36w5d    Delivery Information for  Elvis Coleman    Birth information:  YOB: 2016   Time of birth: 9:19 AM   Sex: male   Head Delivery Date/Time: 2018  9:19 AM   Delivery type: Vaginal, Spontaneous Delivery   Gestational Age: 36w5d    Delivery Providers    Delivering clinician:  Irene Conn MD   Provider Role    B. Cooper Guevara MD Resident    Alyce Hu, RN Delivery Nurse    Tamra Mcpherson, RN Registered Nurse    [facility]    Liz Stephens RN Registered Nurse            Measurements    Weight:  2438 g  Length:           Apgars    Living status:  Living  Apgars:   1 min.:   5 min.:   10 min.:   15 min.:   20 min.:     Skin color:   1  1       Heart rate:   2  2       Reflex irritability:   2  2       Muscle tone:   2  2       Respiratory effort:   2  2       Total:   9  9       Apgars assigned by:  LIZ STEPHENS RN         Operative Delivery    Forceps attempted?:  No  Vacuum extractor attempted?:  No         Shoulder Dystocia    Shoulder dystocia present?:  No            Presentation    Presentation:  Vertex  Position:  Left Occiput Anterior           Interventions/Resuscitation    Method:  Bulb Suctioning, Tactile Stimulation       Cord    Vessels:  3 vessels  Complications:  None  Delayed Cord Clamping?:  Yes  Cord Clamped Date/Time:  2018  9:21 AM  Cord Blood Disposition:  Sent with Baby  Gases Sent?:  No  Stem Cell Collection (by MD):  No       Placenta    Placenta delivery date/time:  2018  Placenta removal:  Spontaneous  Placenta appearance:  Intact  Placenta disposition:  discarded           Labor Events:       labor: Yes     Labor Onset Date/Time:         Dilation Complete Date/Time: 2018 08:56     Start Pushing Date/Time: 2018 09:00     Rupture Date/Time: 18         Rupture type:           Fluid Amount:        Fluid Color:        Fluid Odor:        Membrane Status (PeriCalm): SRM (Spontaneous Rupture)      Rupture Date/Time (PeriCalm): 2018 21:30:00      Fluid Amount (PeriCalm): Moderate      Fluid Color (PeriCalm): Clear       steroids: Partial Course     Antibiotics given for GBS: Yes     Induction: misoprostol     Indications for induction:        Augmentation: oxytocin     Indications for augmentation:       Labor complications: None     Additional complications:          Cervical ripening:                     Delivery:      Episiotomy: None     Indication for Episiotomy:       Perineal Lacerations: None Repaired:      Periurethral Laceration: bilateral Repaired: Yes   Labial Laceration: none Repaired:     Sulcus Laceration: none Repaired:     Vaginal Laceration: No Repaired:     Cervical Laceration: No Repaired:     Repair suture:       Repair # of packets: 2     Vaginal delivery QBL (mL): 225      QBL (mL): 0     Combined Blood Loss (mL): 225     Vaginal Sweep Performed: Yes     Surgicount Correct: Yes       Other providers:       Anesthesia    Method:  Epidural          Details (if  applicable):  Trial of Labor      Categorization:      Priority:     Indications for :     Incision Type:       Additional  information:  Forceps:    Vacuum:    Breech:    Observed anomalies    Other (Comments):           Tenzin Guevara MD   PGY-4 Ob-gyn

## 2018-08-29 NOTE — PROGRESS NOTES
LABOR NOTE    S:  Complaints: No.  Epidural working:  not applicable  MD to bedside routine cervical check       O: /70   Pulse 84   Temp 98.1 °F (36.7 °C) (Temporal)   Resp 18   Wt 78 kg (171 lb 15.3 oz)   LMP 2017   SpO2 99%   BMI 25.39 kg/m²       FHT: 110 Cat 1 (reassuring)  CTX: q 3 minutes  SVE: /-2      ASSESSMENT:   33 y.o.  at 36w5d    FHT reassuring    Active Hospital Problems    Diagnosis  POA    36 weeks gestation of pregnancy [Z3A.36]  Not Applicable      Resolved Hospital Problems   No resolved problems to display.         PLAN:    Continue Close Maternal/Fetal Monitoring  Will begin Pitocin Augmentation per protocol  Recheck 2 hours or PRN    Tenzin Guevara MD   PGY-4 Ob-gyn

## 2018-08-29 NOTE — PROGRESS NOTES
Patient delivered at 0919. Pitocin 500 mL bolus given post placenta delivery. Pitocin 125 mL/hr for 3.5 hours given. Fundus firm without massage. Lochia amount: moderate without odor. Patient urinated 200 mL of urine post vaginal delivery. Vital signs stable. Patient remained afebrile. Patient transported to Mother Baby unit. No apparent distress noted. Report given to Mother Baby nurse.

## 2018-08-29 NOTE — DISCHARGE INSTRUCTIONS
Breastfeeding Discharge Instructions       Feed the baby at the earliest sign of hunger or comfort  o Hands to mouth, sucking motions  o Rooting or searching for something to suck on  o Dont wait for crying - it is a sign of distress     The feedings may be 8-12 times per 24hrs and will not follow a schedule   Avoid pacifiers and bottles for the first 4 weeks   Alternate the breast you start the feeding with, or start with the breast that feels the fullest   Switch breasts when the baby takes himself off the breast or falls asleep   Keep offering breasts until the baby looks full, no longer gives hunger signs, and stays asleep when placed on his back in the crib   If the baby is sleepy and wont wake for a feeding, put the baby skin-to-skin dressed in a diaper against the mothers bare chest   Sleep near your baby   The baby should be positioned and latched on to the breast correctly  o Chest-to-chest, chin in the breast  o Babys lips are flipped outward  o Babys mouth is stretched open wide like a shout  o Babys sucking should feel like tugging to the mother  - The baby should be drinking at the breast:  o You should hear swallowing or gulping throughout the feeding  o You should see milk on the babys lips when he comes off the breast  o Your breasts should be softer when the baby is finished feeding  o The baby should look relaxed at the end of feedings  o After the 4th day and your milk is in:  o The babys poop should turn bright yellow and be loose, watery, and seedy  o The baby should have at least 3-4 poops the size of the palm of your hand per day  o The baby should have at least 5-6 wet diapers per day  o The urine should be light yellow in color  You should drink when you are thirsty and eat a healthy diet when you are    hungry.     Take naps to get the rest you need.   Take medications and/or drink alcohol only with permission of your obstetrician    or the babys pediatrician.  You can  also call the [facility] Risk [facility],   ([phone]), Monday-Friday, 8am-5pm Central time, to get the most   up-to-date evidence-based information on the use of medications during   pregnancy and breastfeeding.      The baby should be examined by a pediatrician at 3-5 days of age.  Once your   milk comes in, the baby should be gaining at least ½ - 1oz each day and should be back to birthweight no later than 10-14 days of age.          Community Resources    [facility] Breastfeeding Warmline: [phone]   Local [facility] clinics: provide incentives and breastpumps to eligible mothers  [employer] (LLLI):  mother-to-mother support group website        www.[employer] Network[employer].Wriggle  Local La Leche League mother-to-mother support groups:        www.GlassesOff        La Leche League [facility]   Dr. Franklin Lozano website for latch videos and general information:        www.breastfeedinginc.ca  [facility] is a call center that provides information about the safety of taking medications while breastfeeding.  Call 1-267.959.5620, M-F, 8am-5pm, [place].  International Lactation Consultant Association provides resources for assistance:        www.ilca.org  [place] Breastfeeding Coalition provides informationand resources for parents  and the community    www.LaBreastfeedingSupport.org     Barbie Cuellar is a mom-to-mom support group:                             www.DocsInkcricketVimessa.com//breastfeedng-support/  [employer] for Healthy Babies:  3-468-176-BABY(8710)  [employer]: a breastfeeding support group for women of color, [phone]

## 2018-08-29 NOTE — ANESTHESIA PROCEDURE NOTES
Epidural    Patient location during procedure: OB   Reason for block: primary anesthetic   Diagnosis: Intrauterine Pregnancy   Start time: 8/29/2018 7:02 AM  Timeout: 8/29/2018 7:00 AM  End time: 8/29/2018 7:15 AM  Staffing  Anesthesiologist: Rhonda G Leopold, MD  Resident/CRNA: Deni Platt MD  Performed: resident/CRNA   Preanesthetic Checklist  Completed: patient identified, site marked, surgical consent, pre-op evaluation, timeout performed, IV checked, risks and benefits discussed, monitors and equipment checked, anesthesia consent given, hand hygiene performed and patient being monitored  Preparation  Patient position: sitting  Prep: ChloraPrep  Patient monitoring: Pulse Ox and Blood Pressure  Epidural  Skin Anesthetic: lidocaine 1%  Skin Wheal: 3 mL  Administration type: continuous  Approach: midline  Interspace: L3-4  Injection technique: PIERCE air  Needle and Epidural Catheter  Needle type: Tuohy   Needle gauge: 17  Needle length: 3.5 inches  Needle insertion depth: 5 cm  Catheter type: springwound and multi-orifice  Catheter size: 18 G  Catheter at skin depth: 9 cm  Test dose: 3 mL of lidocaine 1.5% with Epi 1-to-200,000  Additional Documentation: incremental injection, negative aspiration for heme and CSF, no paresthesia on injection and no significant pain on injection  Needle localization: anatomical landmarks  Medications:  Volume per aspiration: 5 mL  Time between aspirations: 5 minutes  Assessment  Ease of block: easy  Additional Notes  Bolused with 10cc of .1%

## 2018-08-29 NOTE — PROGRESS NOTES
Mother encouraged to provide breastmilk for her baby. Benefits of breastmilk discussed with mother. Mother declines breast feeding at this time. Mother encouraged to inform nurse if she changes her mind.        Instructed on the risks of formula feeding including:  · Lacks the nutrients found in colostrums to help prevent infection, mature the gut, aid in digestion and resist allergies  · Contains artificial additives and preservatives which increases incidence of contamination  · Increase spitting up due to slower digestion  · Increased cost and requires preparation, including bottle sanitation and formula refrigeration  · Increased incidence of NEC for the  baby  · Increased risk of diabetes with family history, SIDS and ear infections  · Skipped feedings for the breastfeeding mother increases chance of engorgement, mastitis and plugged ducts  · Decreases breastfeeding babys appetite resulting in poor feeding session, decreased breast stimulation and poor milk supply  · Exposes the breastfeeding baby to the possibility of allergic reactions and colic  Pt states understanding and verbalized appropriate recall.

## 2018-08-29 NOTE — SUBJECTIVE & OBJECTIVE
Obstetric History       T0      L0     SAB0   TAB0   Ectopic0   Multiple0   Live Births0       # Outcome Date GA Lbr Tom/2nd Weight Sex Delivery Anes PTL Lv   1 Current                 Past Medical History:   Diagnosis Date    Abnormal Pap smear of cervix     Anxiety     Depression     Thyroid disease     hypothyroidism     No past surgical history on file.    PTA Medications   Medication Sig    prenatal vit calc,iron,folic (PRENATAL VITAMIN ORAL) Take by mouth.    PREPLUS 27 mg iron- 1 mg Tab TK ONE T PO ONCE D       Review of patient's allergies indicates:   Allergen Reactions    Pcn [penicillins] Hives        Family History     Problem Relation (Age of Onset)    Diabetes Father, Mother    Hypertension Father, Mother    Stroke Mother        Tobacco Use    Smoking status: Current Every Day Smoker     Packs/day: 0.50     Years: 15.00     Pack years: 7.50     Types: Cigarettes    Smokeless tobacco: Never Used   Substance and Sexual Activity    Alcohol use: No    Drug use: No    Sexual activity: Yes     Review of Systems   Constitutional: Negative for chills and fever.   Respiratory: Negative for shortness of breath.    Cardiovascular: Negative for chest pain.   Gastrointestinal: Positive for abdominal pain (cramping). Negative for constipation, diarrhea, nausea and vomiting.   Genitourinary: Positive for vaginal discharge (clear fluid). Negative for dysuria and hematuria.   Neurological: Negative for headaches.      Objective:     Vital Signs (Most Recent):  Temp: 97.8 °F (36.6 °C) (18 0721)  Pulse: 77 (18 0829)  Resp: 18 (18 0721)  BP: 122/71 (18 0826)  SpO2: 99 % (18 0829) Vital Signs (24h Range):  Temp:  [97.8 °F (36.6 °C)-98.4 °F (36.9 °C)] 97.8 °F (36.6 °C)  Pulse:  [69-95] 77  Resp:  [18] 18  SpO2:  [97 %-100 %] 99 %  BP: (101-137)/(54-81) 122/71     Weight: 78 kg (171 lb 15.3 oz)  Body mass index is 25.39 kg/m².    FHT: 140 Cat 1 (reassuring)  TOCO:   not visible    Physical Exam:   Constitutional: She is oriented to person, place, and time. She appears well-developed and well-nourished. No distress.    HENT:   Head: Normocephalic and atraumatic.    Eyes: EOM are normal.    Neck: Normal range of motion.    Cardiovascular: Normal rate, regular rhythm and normal heart sounds.     Pulmonary/Chest: Effort normal and breath sounds normal. No respiratory distress.        Abdominal: There is no tenderness.   Gravid      Genitourinary:   Genitourinary Comments: SSE: gross pooling, nitrazine positive               Neurological: She is alert and oriented to person, place, and time.    Skin: Skin is warm and dry. She is not diaphoretic.    Psychiatric: She has a normal mood and affect. Her behavior is normal. Judgment and thought content normal.       Cervix:  Dilation:  1  Effacement:  60  Station: -3  Presentation: Vertex     Significant Labs:  Lab Results   Component Value Date    GROUPTR O POS 08/29/2018       I have personallly reviewed all pertinent lab results from the last 24 hours.

## 2018-08-29 NOTE — ED PROVIDER NOTES
Encounter Date: 2018       History   No chief complaint on file.    Nat Coleman is a 33 y.o. F at 36w5d presents complaining of leakage of clear fluids starting at 9:30pm this evening. She has been having cramping since then but no contractions or vaginal bleeding. She notes good fetal movement.   This IUP is complicated by tobacco abuse and h/o hypothyroidism for which she is not on medication.              Review of patient's allergies indicates:   Allergen Reactions    Pcn [penicillins] Hives     Past Medical History:   Diagnosis Date    Abnormal Pap smear of cervix     Anxiety     Depression     Thyroid disease     hypothyroidism     No past surgical history on file.  Family History   Problem Relation Age of Onset    Diabetes Father     Hypertension Father     Diabetes Mother     Hypertension Mother     Stroke Mother     Cancer Neg Hx     Ovarian cancer Neg Hx      Social History     Tobacco Use    Smoking status: Current Every Day Smoker     Packs/day: 0.50     Years: 15.00     Pack years: 7.50     Types: Cigarettes    Smokeless tobacco: Never Used   Substance Use Topics    Alcohol use: No    Drug use: No     Review of Systems   Constitutional: Negative for chills and fever.   Eyes: Negative for photophobia and visual disturbance.   Respiratory: Negative for shortness of breath.    Cardiovascular: Negative for chest pain.   Gastrointestinal: Positive for abdominal pain. Negative for nausea and vomiting.   Genitourinary: Positive for vaginal discharge. Negative for dysuria, flank pain and vaginal bleeding.   Musculoskeletal: Negative for back pain.   Skin: Negative for rash.   Neurological: Negative for headaches.     Constitutional: Negative for chills and fever.   Respiratory: Negative for shortness of breath.    Cardiovascular: Negative for chest pain.   Gastrointestinal: Positive for abdominal pain (cramping). Negative for constipation, diarrhea, nausea and vomiting.    Genitourinary: Positive for vaginal discharge (clear fluid). Negative for dysuria and hematuria.   Neurological: Negative for headaches.       Physical Exam     Initial Vitals   BP Pulse Resp Temp SpO2   08/28/18 2350 08/28/18 2350 08/29/18 0256 08/28/18 2352 08/28/18 2350   123/77 81 18 98.2 °F (36.8 °C) 100 %      MAP       --                Physical Exam    Vitals reviewed.  Constitutional: She appears well-developed and well-nourished. No distress.   Cardiovascular: Normal rate.   Pulmonary/Chest: Breath sounds normal.   Abdominal: Soft. She exhibits no distension. There is no tenderness. There is no rebound and no guarding.   Musculoskeletal: She exhibits no edema or tenderness.   Neurological: She is alert and oriented to person, place, and time.   Skin: Skin is warm and dry.   Psychiatric: She has a normal mood and affect.     OB LABOR EXAM:   Pre-Term Labor: No.   Membranes ruptured: Yes.   Method: Sterile vaginal exam per MD and Sterile speculum exam per MD.   Vaginal Bleeding: none present.     Dilatation: 1.   Station: -3.   Effacement: 60%.   Amniotic Fluid Color: clear.   Amniotic Fluid Amount: large.   Comments: Ultrasound confirms vertex     Constitutional: She is oriented to person, place, and time. She appears well-developed and well-nourished. No distress.    HENT:   Head: Normocephalic and atraumatic.    Eyes: EOM are normal.    Neck: Normal range of motion.    Cardiovascular: Normal rate, regular rhythm and normal heart sounds.     Pulmonary/Chest: Effort normal and breath sounds normal. No respiratory distress.         Abdominal: There is no tenderness.   Gravid      Genitourinary:   Genitourinary Comments: SSE: gross pooling, nitrazine positive               Neurological: She is alert and oriented to person, place, and time.    Skin: Skin is warm and dry. She is not diaphoretic.    Psychiatric: She has a normal mood and affect. Her behavior is normal. Judgment and thought content normal.       ED Course   Fetal non-stress test  Date/Time: 2018 3:10 PM  Performed by: Sushma K Reddy, MD  Authorized by: Irene Conn MD     Nonstress Test:     Variability:  6-25 BPM    Decelerations:  None    Accelerations:  15 bpm    Acoustic Stimulator: No      Baseline:  140    Contractions:  Not present  Biophysical Profile:     Nonstress Test Interpretation: reactive      Overall Impression:  Reassuring        Labs Reviewed   CBC W/ AUTO DIFFERENTIAL - Abnormal; Notable for the following components:       Result Value    WBC 13.23 (*)     Gran # (ANC) 7.9 (*)     All other components within normal limits   POCT URINALYSIS, DIPSTICK OR TABLET REAGENT, AUTOMATED, WITH MICROSCOP          Imaging Results    None          Medical Decision Making:   ED Management:  NST reactive and reassuring  VSS  Grossly ruptured, nitrazine pos  Scanned and consented  To L&D  GBS unknown/, PCN allergy. Will treat with vanc.              Attending Attestation:   Physician Attestation Statement for Resident:  As the supervising MD   Physician Attestation Statement: I have personally seen and examined this patient.   I agree with the above history. -:   As the supervising MD I agree with the above PE.    As the supervising MD I agree with the above treatment, course, plan, and disposition.   -: Patient evaluated and found to be stable, agree with resident's assessment and plan.  I was personally present during the critical portions of the procedure(s) performed by the resident and was immediately available in the ED to provide services and assistance as needed during the entire procedure.  I have reviewed the following: old records at this facility.                    ED Course as of Aug 29 150   Tue Aug 28, 2018   2354 36 weeks, suspected ROM.  reactive/reassuring uterine irritability. GBBS sent today, pending, PCN allergic  [AR]      ED Course User Index  [AR] Sondra Jimenez MD     Clinical Impression:   The  primary encounter diagnosis was 36 weeks gestation of pregnancy. A diagnosis of Amniotic fluid leaking was also pertinent to this visit.      Disposition:   Disposition: Admitted  Condition: Stable                        Sushma K Reddy, MD  Resident  08/29/18 1511       Sondra Jimenez MD  08/31/18 1205

## 2018-08-29 NOTE — ANESTHESIA PREPROCEDURE EVALUATION
2018  Nat Coleman is a 33 y.o. female  with a PMHx of hypothyroidism, anxiety, and depression admitted for PPROM.    Anesthesia Evaluation    I have reviewed the Patient Summary Reports.     I have reviewed the Medications.     Review of Systems  Anesthesia Hx:  Denies Family Hx of Anesthesia complications.   Denies Personal Hx of Anesthesia complications.       Physical Exam  General:  Well nourished    Airway/Jaw/Neck:  Airway Findings: Mouth Opening: Normal General Airway Assessment: Adult  Mallampati: III  TM Distance: Normal, at least 6 cm       Chest/Lungs:  Chest/Lungs Findings: Clear to auscultation, Normal Respiratory Rate     Heart/Vascular:  Heart Findings: Rate: Normal        Mental Status:  Mental Status Findings:  Cooperative         Anesthesia Plan  Type of Anesthesia, risks & benefits discussed:  Anesthesia Type:  epidural  Patient's Preference:   Intra-op Monitoring Plan:   Intra-op Monitoring Plan Comments:   Post Op Pain Control Plan: epidural analgesia  Post Op Pain Control Plan Comments:   Induction:    Beta Blocker:  Patient is not currently on a Beta-Blocker (No further documentation required).       Informed Consent: Patient understands risks and agrees with Anesthesia plan.  Questions answered. Anesthesia consent signed with patient.  ASA Score: 2     Day of Surgery Review of History & Physical: I have interviewed and examined the patient. I have reviewed the patient's H&P dated:  There are no significant changes.   H&P completed by Anesthesiologist.       Ready For Surgery From Anesthesia Perspective.

## 2018-08-30 LAB
C TRACH DNA SPEC QL NAA+PROBE: NOT DETECTED
HBV SURFACE AG SERPL QL IA: NEGATIVE
N GONORRHOEA DNA SPEC QL NAA+PROBE: NOT DETECTED

## 2018-08-30 PROCEDURE — 99232 SBSQ HOSP IP/OBS MODERATE 35: CPT | Mod: ,,, | Performed by: OBSTETRICS & GYNECOLOGY

## 2018-08-30 PROCEDURE — 11000001 HC ACUTE MED/SURG PRIVATE ROOM

## 2018-08-30 PROCEDURE — 25000003 PHARM REV CODE 250: Performed by: OBSTETRICS & GYNECOLOGY

## 2018-08-30 RX ADMIN — IBUPROFEN 600 MG: 600 TABLET ORAL at 08:08

## 2018-08-30 RX ADMIN — IBUPROFEN 600 MG: 600 TABLET ORAL at 02:08

## 2018-08-30 RX ADMIN — IBUPROFEN 600 MG: 600 TABLET ORAL at 01:08

## 2018-08-30 RX ADMIN — STANDARDIZED SENNA CONCENTRATE AND DOCUSATE SODIUM 1 TABLET: 8.6; 5 TABLET, FILM COATED ORAL at 08:08

## 2018-08-30 NOTE — ANESTHESIA POSTPROCEDURE EVALUATION
Anesthesia Post Evaluation    Patient: Nat Coleman    Procedure(s) Performed: * No procedures listed *    Final Anesthesia Type: epidural  Patient location during evaluation: floor  Patient participation: Yes- Able to Participate  Level of consciousness: awake and alert and oriented  Post-procedure vital signs: reviewed and stable  Pain management: adequate  Airway patency: patent  PONV status at discharge: No PONV  Anesthetic complications: no      Cardiovascular status: hemodynamically stable, blood pressure returned to baseline and stable  Respiratory status: unassisted, spontaneous ventilation and room air  Hydration status: euvolemic  Follow-up not needed.        Visit Vitals  /72   Pulse 69   Temp 36.8 °C (98.2 °F) (Oral)   Resp 18   Wt 78 kg (171 lb 15.3 oz)   LMP 12/25/2017   SpO2 98%   Breastfeeding? Unknown   BMI 25.39 kg/m²       Pain/Romaine Score: Pain Rating Prior to Med Admin: 4 (8/30/2018  8:22 AM)  Pain Rating Post Med Admin: 4 (8/30/2018  3:04 AM)

## 2018-08-30 NOTE — NURSING
Mother stated that the car seat will either be here tonight or in the morning. Instructed pt that the car seat test takes about 1 hour and a half and would be better if the car seat was brought tonight.

## 2018-08-30 NOTE — ANESTHESIA POSTPROCEDURE EVALUATION
Anesthesia Post Evaluation    Patient: Nat Coleman    Procedure(s) Performed: * No procedures listed *    Final Anesthesia Type: epidural  Patient location during evaluation: labor & delivery  Patient participation: Yes- Able to Participate  Level of consciousness: awake and alert  Pain management: adequate  Airway patency: patent  PONV status at discharge: No PONV  Anesthetic complications: no      Cardiovascular status: blood pressure returned to baseline  Respiratory status: unassisted  Hydration status: euvolemic  Follow-up needed         Visit Vitals  /72   Pulse 69   Temp 36.8 °C (98.2 °F) (Oral)   Resp 18   Wt 78 kg (171 lb 15.3 oz)   LMP 12/25/2017   SpO2 98%   Breastfeeding? Unknown   BMI 25.39 kg/m²       Pain/Romaine Score: Pain Rating Prior to Med Admin: 4 (8/30/2018  1:21 PM)  Pain Rating Post Med Admin: 2 (8/30/2018  9:22 AM)

## 2018-08-30 NOTE — PLAN OF CARE
Problem: Patient Care Overview  Goal: Plan of Care Review  Outcome: Ongoing (interventions implemented as appropriate)  Pt AA0x3 and VSS. Two side rails up, bed locked, call light within reach. No falls noted as these precautions remain. Pt free of skin breakdown as the pt moves well independently. Voiding without difficulty. Passing flatus. Vaginal bleeding moderate and controlled. Uterus midline and firm. Pain controlled well with PO motrin. Care for today explained and pt verbalizes understanding. Hourly rounds made and no complaints at this time noted. Will resume with plan of care.

## 2018-08-30 NOTE — PROGRESS NOTES
POSTPARTUM PROGRESS NOTE     Nat Coleman is a 33 y.o. female PPD #1 status post Spontaneous vaginal delivery at 36w5d in a pregnancy complicated by tobacco abuse. Patient is doing well this morning. She denies nausea, vomiting, fever or chills.  Patient reports mild abdominal pain that is well relieved by oral pain medications. Lochia is mild to moderate  and stable. Patient is voiding without difficulty and ambulating with mild difficulty.   Patient does not plan to breast feed. Undecided for contraception. She desires circumcision.     Objective:       Temp:  [97.7 °F (36.5 °C)-98.3 °F (36.8 °C)] 98.2 °F (36.8 °C)  Pulse:  [45-82] 69  Resp:  [18] 18  SpO2:  [97 %-98 %] 98 %  BP: (113-117)/(69-73) 113/72    General:   alert, appears stated age and cooperative   Lungs:   clear to auscultation bilaterally   Heart:   regular rate and rhythm, S1, S2 normal, no murmur, click, rub or gallop   Abdomen:  soft, non-tender; bowel sounds normal; no masses,  no organomegaly   Uterus:  firm located at the umblicus.    Extremities: peripheral pulses normal, no pedal edema, no clubbing or cyanosis     Lab Review  No results found for this or any previous visit (from the past 4 hour(s)).    I/O  No intake or output data in the 24 hours ending 18 1303     Assessment:     Patient Active Problem List   Diagnosis    Supervision of normal pregnancy    Tobacco use in pregnancy    36 weeks gestation of pregnancy     premature rupture of membranes (PPROM) with unknown onset of labor     (spontaneous vaginal delivery)        Plan:   1. Postpartum care:  - Patient doing well. Continue routine management and advances.  - Continue PO pain meds. Pain well controlled.  - Heme: H/h   - Encourage ambulation  - Circumcision consents signed and intact  - Contraception undecided    Tenzin Guevara MD   PGY-4 Ob-gyn    I have personally taken the history and have examined this patient, and I agree with the resident's note  as stated above.

## 2018-08-30 NOTE — NURSING
Mother instructed to have car seat brought to room d/t infant needing car seat test before discharge.

## 2018-08-31 VITALS
HEART RATE: 78 BPM | BODY MASS INDEX: 25.39 KG/M2 | DIASTOLIC BLOOD PRESSURE: 58 MMHG | OXYGEN SATURATION: 99 % | RESPIRATION RATE: 18 BRPM | SYSTOLIC BLOOD PRESSURE: 126 MMHG | WEIGHT: 171.94 LBS | TEMPERATURE: 98 F

## 2018-08-31 PROCEDURE — 25000003 PHARM REV CODE 250: Performed by: OBSTETRICS & GYNECOLOGY

## 2018-08-31 PROCEDURE — 99238 HOSP IP/OBS DSCHRG MGMT 30/<: CPT | Mod: ,,, | Performed by: OBSTETRICS & GYNECOLOGY

## 2018-08-31 RX ORDER — IBUPROFEN 600 MG/1
600 TABLET ORAL EVERY 6 HOURS
Qty: 60 TABLET | Refills: 1 | OUTPATIENT
Start: 2018-08-31 | End: 2019-12-16

## 2018-08-31 RX ADMIN — IBUPROFEN 600 MG: 600 TABLET ORAL at 04:08

## 2018-08-31 RX ADMIN — IBUPROFEN 600 MG: 600 TABLET ORAL at 09:08

## 2018-08-31 RX ADMIN — IBUPROFEN 600 MG: 600 TABLET ORAL at 02:08

## 2018-08-31 NOTE — PLAN OF CARE
Problem: Patient Care Overview  Goal: Plan of Care Review  Outcome: Ongoing (interventions implemented as appropriate)  Pt ambulating and voiding without difficulty. Patient safety maintained, side rails up, bed low and locked position.  Pain well controlled with PO pain medication. Fundus midline, firm, with light lochia. VSS. Parent responding to infant cues and bonding appropriately. Will continue to monitor.

## 2018-08-31 NOTE — LACTATION NOTE
Discharge breastfeeding education given. Questions answered. Pt given education on manual pump for home use. Encouraged pt to call for latch assessment prior to discharge. Pt has lc number on white board. Pt has warmline number to call for assistance with breastfeeding after discharge.

## 2018-08-31 NOTE — PROGRESS NOTES
POSTPARTUM PROGRESS NOTE      Nat Coleman is a 33 y.o. female PPD #2 status post Spontaneous vaginal delivery at 36w5d in a pregnancy complicated by tobacco abuse. Patient is doing well this morning. She denies nausea, vomiting, fever or chills.  Patient reports mild abdominal pain that is well relieved by oral pain medications. Lochia is mild to moderate  and stable. Patient is voiding without difficulty and ambulating with mild difficulty.   Patient does not plan to breast feed. Undecided for contraception. She desires circumcision.      Objective:      Temp:  [97.9 °F (36.6 °C)-98.3 °F (36.8 °C)] 97.9 °F (36.6 °C)  Pulse:  [69-73] 71  Resp:  [18] 18  SpO2:  [97 %-100 %] 97 %  BP: (113-120)/(67-72) 118/69             General:   alert, appears stated age and cooperative    Lungs:   clear to auscultation bilaterally    Heart:   regular rate and rhythm, S1, S2 normal, no murmur, click, rub or gallop    Abdomen:  soft, non-tender; bowel sounds normal; no masses,  no organomegaly    Uterus:  firm located at the umblicus.    Extremities: peripheral pulses normal, no pedal edema, no clubbing or cyanosis      Lab Review  Recent Results   No results found for this or any previous visit (from the past 4 hour(s)).        I/O  No intake or output data in the 24 hours ending 18 1303     Assessment:          Patient Active Problem List   Diagnosis    Supervision of normal pregnancy    Tobacco use in pregnancy    36 weeks gestation of pregnancy     premature rupture of membranes (PPROM) with unknown onset of labor     (spontaneous vaginal delivery)         Plan:   1. Postpartum care:  - Patient doing well. Continue routine management and advances.  - Continue PO pain meds. Pain well controlled.  - Heme: H/h  > Post H/H    - Encourage ambulation  - Circumcision consents signed and intact  - Contraception- Patient Declines. Was appropriately counseled.     Will discharge today.     Haroon Davis  M.D.  PGY2 OB/GYN

## 2018-08-31 NOTE — PROGRESS NOTES
[employer] called and notified of patient transportation to [address] [place] [address] from 2nd floor [place] at [facility]. Cab voucher given to transport.

## 2018-08-31 NOTE — DISCHARGE SUMMARY
Delivery Discharge Summary  Obstetrics        Primary OB Clinician: Irene Conn MD      Admission date: 2018  Discharge date: 2018     Disposition: To home, self care     Discharge Diagnosis List:            Patient Active Problem List   Diagnosis    Supervision of normal pregnancy    Tobacco use in pregnancy    36 weeks gestation of pregnancy     premature rupture of membranes (PPROM) with unknown onset of labor     (spontaneous vaginal delivery)         Procedure:      Hospital Course:  Nat Coleman is a 33 y.o. now , PPD #2 who was admitted on 2018 at 36w5d for  premature rupture of membranes with unknown onset of labor. This pregnancy was complicated by tobacco abuse and history of hypothyroidism, for which she does not take medication. Patient was subsequently admitted to labor and delivery unit with signed consents.      Labor course was uncomplicated and resulted in  without complications.     Please see delivery note for further details. Her postpartum course was uncomplicated. On discharge day, patient's pain is controlled with oral pain medications. Pt is tolerating ambulation without SOB or CP, and regular diet without N/V. Reports lochia is mild. Denies any HA, vision changes, F/C, LE swelling. Denies any breast pain/soreness.     Pt in stable condition and ready for discharge. She has been instructed to start and/or continue medications and follow up with her obstetrics provider as listed below.     Pertinent studies:  Postpartum CBC        Lab Results   Component Value Date     WBC 18.01 (H) 2018     HGB 11.1 (L) 2018     HCT 33.4 (L) 2018     MCV 90 2018      2018        There is no immunization history for the selected administration types on file for this patient.      Delivery:     Episiotomy: None   Lacerations: None   Repair suture:     Repair # of packets: 2   Blood loss (ml): 225      Birth  information:  YOB: 2016   Time of birth: 9:19 AM   Sex: male   Delivery type: Vaginal, Spontaneous Delivery   Gestational Age: 36w5d     Delivery Clinician:        Other providers:          Additional  information:       Forceps:     Vacuum:     Breech:     Observed anomalies        Living?:            APGARS  One minute Five minutes Ten minutes   Skin color:         Heart rate:         Grimace:         Muscle tone:         Breathing:         Totals: 9  9         Placenta: Delivered:       appearance        Patient Instructions:       Current Discharge Medication List           START taking these medications     Details   ibuprofen (ADVIL,MOTRIN) 600 MG tablet Take 1 tablet (600 mg total) by mouth every 6 (six) hours.  Qty: 60 tablet, Refills: 1               CONTINUE these medications which have NOT CHANGED     Details   prenatal vit calc,iron,folic (PRENATAL VITAMIN ORAL) Take by mouth.       PREPLUS 27 mg iron- 1 mg Tab TK ONE T PO ONCE D  Refills: 0                     Discharge Procedure Orders   Other restrictions (specify):   Order Comments: Pelvic Rest - Nothing in the Vagina for 6 weeks.          Notify your health care provider if you experience any of the following:   Order Comments: Vaginal Bleeding greater than a pad per hour.      Notify your health care provider if you experience any of the following:  increased confusion or weakness      Notify your health care provider if you experience any of the following:  persistent dizziness, light-headedness, or visual disturbances      Notify your health care provider if you experience any of the following:  worsening rash      Notify your health care provider if you experience any of the following:  severe persistent headache      Notify your health care provider if you experience any of the following:  difficulty breathing or increased cough      Notify your health care provider if you experience any of the following:  redness, tenderness, or  signs of infection (pain, swelling, redness, odor or green/yellow discharge around incision site)      Notify your health care provider if you experience any of the following:  severe uncontrolled pain      Notify your health care provider if you experience any of the following:  persistent nausea and vomiting or diarrhea      Notify your health care provider if you experience any of the following:  temperature >100.4      Activity as tolerated             Follow-up Information      [facility].    Specialty:  Obstetrics and Gynecology  Contact information:  [address] [facility] [address] [phone]       Haroon Davis M.D.  PGY2 OB/GYN

## 2018-08-31 NOTE — PLAN OF CARE
Problem: Patient Care Overview  Goal: Plan of Care Review  Outcome: Outcome(s) achieved Date Met: 08/31/18  VSS. Ambulating and voiding without difficulty. Fundus is firm and midline. Vaginal bleeding is small. Tolerating a regular diet; pain controlled with oral pain medication. Ok to d/c home per MD order. Mother baby care guide reviewed with patient; questions answered. Discharge instructions reviewed with patient; patient verbalizes understanding. IDbands verified. Paperwork signed. Prescriptions filled by [facility] and delivered to patient's bedside.

## 2018-09-04 LAB — BACTERIA SPEC AEROBE CULT: NORMAL

## 2019-08-05 PROBLEM — O42.919 PRETERM PREMATURE RUPTURE OF MEMBRANES (PPROM) WITH UNKNOWN ONSET OF LABOR: Status: RESOLVED | Noted: 2018-08-29 | Resolved: 2019-08-05

## 2019-08-05 PROBLEM — Z3A.36 36 WEEKS GESTATION OF PREGNANCY: Status: RESOLVED | Noted: 2018-08-29 | Resolved: 2019-08-05

## 2019-12-15 ENCOUNTER — HOSPITAL ENCOUNTER (EMERGENCY)
Facility: HOSPITAL | Age: 34
Discharge: HOME | End: 2019-12-16
Attending: EMERGENCY MEDICINE
Payer: MEDICAID

## 2019-12-15 DIAGNOSIS — S00.03XD HEMATOMA OF SCALP, SUBSEQUENT ENCOUNTER: ICD-10-CM

## 2019-12-15 DIAGNOSIS — S12.091D OTHER CLOSED NONDISPLACED FRACTURE OF FIRST CERVICAL VERTEBRA WITH ROUTINE HEALING, SUBSEQUENT ENCOUNTER: ICD-10-CM

## 2019-12-15 DIAGNOSIS — S01.01XD SCALP LACERATION, SUBSEQUENT ENCOUNTER: ICD-10-CM

## 2019-12-15 DIAGNOSIS — R07.89 CHEST WALL DISCOMFORT: ICD-10-CM
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
## 2019-12-15 DIAGNOSIS — R91.1 INCIDENTAL PULMONARY NODULE: ICD-10-CM

## 2019-12-15 PROCEDURE — 81025 URINE PREGNANCY TEST: CPT | Performed by: PHYSICIAN ASSISTANT

## 2019-12-15 PROCEDURE — 99284 EMERGENCY DEPT VISIT MOD MDM: CPT | Mod: 25

## 2019-12-15 PROCEDURE — 96361 HYDRATE IV INFUSION ADD-ON: CPT

## 2019-12-15 PROCEDURE — 96374 THER/PROPH/DIAG INJ IV PUSH: CPT

## 2019-12-15 RX ORDER — MORPHINE SULFATE 10 MG/ML
2 INJECTION INTRAMUSCULAR; INTRAVENOUS; SUBCUTANEOUS
Status: COMPLETED | OUTPATIENT
Start: 2019-12-16 | End: 2019-12-16

## 2019-12-16 VITALS
OXYGEN SATURATION: 100 % | RESPIRATION RATE: 18 BRPM | TEMPERATURE: 99 F | HEIGHT: 69 IN | HEART RATE: 97 BPM | BODY MASS INDEX: 20.73 KG/M2 | WEIGHT: 140 LBS | DIASTOLIC BLOOD PRESSURE: 78 MMHG | SYSTOLIC BLOOD PRESSURE: 122 MMHG

## 2019-12-16 LAB
B-HCG UR QL: NEGATIVE
CTP QC/QA: YES

## 2019-12-16 PROCEDURE — 93010 EKG 12-LEAD: ICD-10-PCS | Mod: ,,, | Performed by: INTERNAL MEDICINE

## 2019-12-16 PROCEDURE — 63600175 PHARM REV CODE 636 W HCPCS: Performed by: PHYSICIAN ASSISTANT

## 2019-12-16 PROCEDURE — 93005 ELECTROCARDIOGRAM TRACING: CPT

## 2019-12-16 PROCEDURE — 93010 ELECTROCARDIOGRAM REPORT: CPT | Mod: ,,, | Performed by: INTERNAL MEDICINE

## 2019-12-16 RX ORDER — OXYCODONE AND ACETAMINOPHEN 5; 325 MG/1; MG/1
1 TABLET ORAL EVERY 6 HOURS PRN
Qty: 12 TABLET | Refills: 0 | Status: SHIPPED | OUTPATIENT
Start: 2019-12-16

## 2019-12-16 RX ORDER — DOCUSATE SODIUM 100 MG/1
100 CAPSULE, LIQUID FILLED ORAL 2 TIMES DAILY
Qty: 20 CAPSULE | Refills: 0 | Status: SHIPPED | OUTPATIENT
Start: 2019-12-16 | End: 2019-12-26

## 2019-12-16 RX ORDER — BACITRACIN ZINC 500 UNIT/G
OINTMENT (GRAM) TOPICAL 2 TIMES DAILY
Qty: 14 G | Refills: 0 | Status: SHIPPED | OUTPATIENT
Start: 2019-12-16

## 2019-12-16 RX ORDER — IBUPROFEN 600 MG/1
600 TABLET ORAL EVERY 6 HOURS PRN
Qty: 20 TABLET | Refills: 0 | Status: SHIPPED | OUTPATIENT
Start: 2019-12-16

## 2019-12-16 RX ADMIN — MORPHINE SULFATE 2 MG: 10 INJECTION INTRAVENOUS at 01:12

## 2019-12-16 RX ADMIN — SODIUM CHLORIDE 500 ML: 0.9 INJECTION, SOLUTION INTRAVENOUS at 12:12

## 2019-12-16 NOTE — ED NOTES
Spoke with charge nurse for allowing patient cab access due to patient not having the funds for transport, no family or friends to transport, and patient diagnosed with C6 fx.  Cab transport approved.

## 2019-12-16 NOTE — DISCHARGE INSTRUCTIONS
Keep laceration covered; apply antibiotic ointment twice daily.    Ibuprofen for pain. Percocet for severe/breakthrough pain. Be aware, this medication is sedating.  Do not mix with alcohol or any other sedating medications.  Do not drive or operate machinery when taking this medication.     Keep cervical collar in place for 6 weeks. Schedule a follow-up appt with neurosurgery.    Follow-up with Oral Maxillofacial Surgery clinic in 1 week for suture removal.    Follow-up with a primary care provider for reevaluation of your pulmonary nodule.    Return to this ED if pain worsens despite treatment, if you begin with chest pain or trouble breathing, if you begin with lightheadedness/dizziness, if any other problems occur.
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
## 2022-01-16 ENCOUNTER — HOSPITAL ENCOUNTER (EMERGENCY)
Facility: OTHER | Age: 37
Discharge: HOME | End: 2022-01-16
Attending: EMERGENCY MEDICINE
Payer: MEDICAID

## 2022-01-16 VITALS
WEIGHT: 145 LBS | SYSTOLIC BLOOD PRESSURE: 129 MMHG | BODY MASS INDEX: 21.48 KG/M2 | TEMPERATURE: 98 F | DIASTOLIC BLOOD PRESSURE: 73 MMHG | RESPIRATION RATE: 26 BRPM | OXYGEN SATURATION: 100 % | HEART RATE: 109 BPM | HEIGHT: 69 IN

## 2022-01-16 DIAGNOSIS — T50.901A OVERDOSE: ICD-10-CM

## 2022-01-16 DIAGNOSIS — F11.10 OPIOID ABUSE: Primary | ICD-10-CM

## 2022-01-16 LAB — POCT GLUCOSE: 95 MG/DL (ref 70–110)

## 2022-01-16 PROCEDURE — 96376 TX/PRO/DX INJ SAME DRUG ADON: CPT

## 2022-01-16 PROCEDURE — 93010 ELECTROCARDIOGRAM REPORT: CPT | Mod: ,,, | Performed by: INTERNAL MEDICINE

## 2022-01-16 PROCEDURE — 93010 EKG 12-LEAD: ICD-10-PCS | Mod: ,,, | Performed by: INTERNAL MEDICINE

## 2022-01-16 PROCEDURE — 93005 ELECTROCARDIOGRAM TRACING: CPT

## 2022-01-16 PROCEDURE — 82962 GLUCOSE BLOOD TEST: CPT

## 2022-01-16 PROCEDURE — 96374 THER/PROPH/DIAG INJ IV PUSH: CPT

## 2022-01-16 PROCEDURE — 63600175 PHARM REV CODE 636 W HCPCS: Performed by: EMERGENCY MEDICINE

## 2022-01-16 PROCEDURE — 99284 EMERGENCY DEPT VISIT MOD MDM: CPT | Mod: 25

## 2022-01-16 RX ORDER — NALOXONE HCL 0.4 MG/ML
0.4 VIAL (ML) INJECTION
Status: COMPLETED | OUTPATIENT
Start: 2022-01-16 | End: 2022-01-16

## 2022-01-16 RX ORDER — NALOXONE HYDROCHLORIDE 4 MG/.1ML
SPRAY NASAL
Qty: 1 EACH | Refills: 11 | Status: SHIPPED | OUTPATIENT
Start: 2022-01-16

## 2022-01-16 RX ADMIN — NALXONE HYDROCHLORIDE 0.4 MG: 0.4 INJECTION INTRAMUSCULAR; INTRAVENOUS; SUBCUTANEOUS at 08:01

## 2022-01-16 RX ADMIN — NALXONE HYDROCHLORIDE 0.4 MG: 0.4 INJECTION INTRAMUSCULAR; INTRAVENOUS; SUBCUTANEOUS at 10:01

## 2022-01-17 NOTE — ED TRIAGE NOTES
Pt brought to the ED via EMS c/o overdose. Pt was found by EMS passed out in front of [employer] on  [address]. EMS administered narcan with positive response. AAOx4 upon arrival. Admits heroin use
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
## 2022-01-17 NOTE — ED NOTES
Bed: 03  Expected date:   Expected time:   Means of arrival:   Comments:  OVERDOSE   RN spoke with patient and informed her that the blood pressure was much better. Patient states she checks her pressure often and will let us know if there is any elevated pressures that she is concerned about.